# Patient Record
Sex: MALE | Race: NATIVE HAWAIIAN OR OTHER PACIFIC ISLANDER | Employment: FULL TIME | URBAN - METROPOLITAN AREA
[De-identification: names, ages, dates, MRNs, and addresses within clinical notes are randomized per-mention and may not be internally consistent; named-entity substitution may affect disease eponyms.]

---

## 2017-02-07 ENCOUNTER — ANTI-COAG VISIT (OUTPATIENT)
Dept: INTERNAL MEDICINE CLINIC | Facility: CLINIC | Age: 42
End: 2017-02-07

## 2017-02-07 DIAGNOSIS — D68.8 OTHER SPECIFIED COAGULATION DEFECTS (HCC): ICD-10-CM

## 2017-02-07 DIAGNOSIS — I26.99 PULMONARY EMBOLISM AND INFARCTION (HCC): Primary | ICD-10-CM

## 2017-02-07 LAB — INR: 3.2 (ref 2–3)

## 2017-02-07 PROCEDURE — 36416 COLLJ CAPILLARY BLOOD SPEC: CPT

## 2017-02-07 PROCEDURE — 85610 PROTHROMBIN TIME: CPT

## 2017-04-04 ENCOUNTER — ANTI-COAG VISIT (OUTPATIENT)
Dept: INTERNAL MEDICINE CLINIC | Facility: CLINIC | Age: 42
End: 2017-04-04

## 2017-04-04 DIAGNOSIS — I26.99 PULMONARY EMBOLISM AND INFARCTION (HCC): Primary | ICD-10-CM

## 2017-04-04 DIAGNOSIS — D68.8 OTHER SPECIFIED COAGULATION DEFECTS (HCC): ICD-10-CM

## 2017-04-04 PROCEDURE — 85610 PROTHROMBIN TIME: CPT

## 2017-04-04 PROCEDURE — 36416 COLLJ CAPILLARY BLOOD SPEC: CPT

## 2017-05-01 ENCOUNTER — OFFICE VISIT (OUTPATIENT)
Dept: FAMILY MEDICINE CLINIC | Facility: CLINIC | Age: 42
End: 2017-05-01

## 2017-05-01 VITALS
HEART RATE: 86 BPM | WEIGHT: 152 LBS | SYSTOLIC BLOOD PRESSURE: 120 MMHG | DIASTOLIC BLOOD PRESSURE: 85 MMHG | HEIGHT: 69.5 IN | TEMPERATURE: 98 F | BODY MASS INDEX: 22.01 KG/M2

## 2017-05-01 DIAGNOSIS — K21.9 GASTROESOPHAGEAL REFLUX DISEASE WITHOUT ESOPHAGITIS: ICD-10-CM

## 2017-05-01 DIAGNOSIS — I26.99 PULMONARY EMBOLISM AND INFARCTION (HCC): ICD-10-CM

## 2017-05-01 DIAGNOSIS — Z00.00 ROUTINE GENERAL MEDICAL EXAMINATION AT A HEALTH CARE FACILITY: Primary | ICD-10-CM

## 2017-05-01 DIAGNOSIS — G43.909 MIGRAINE WITHOUT STATUS MIGRAINOSUS, NOT INTRACTABLE, UNSPECIFIED MIGRAINE TYPE: ICD-10-CM

## 2017-05-01 PROCEDURE — 99396 PREV VISIT EST AGE 40-64: CPT | Performed by: FAMILY MEDICINE

## 2017-05-01 RX ORDER — WARFARIN SODIUM 5 MG/1
TABLET ORAL
Qty: 90 TABLET | Refills: 3 | Status: SHIPPED | OUTPATIENT
Start: 2017-05-01 | End: 2018-05-27

## 2017-05-01 RX ORDER — RIZATRIPTAN BENZOATE 10 MG/1
TABLET ORAL
Qty: 12 TABLET | Refills: 11 | Status: SHIPPED | OUTPATIENT
Start: 2017-05-01 | End: 2018-03-11

## 2017-05-01 NOTE — PROGRESS NOTES
HPI:    Patient ID: Aime Anderson is a 38year old male.     HPI  Patient presents with:  Routine Physical    Past Medical History   Diagnosis Date   • DVT (deep venous thrombosis) (HCC)      per ng coumadin   • Pulmonary embolism (HCC)      per ng couma Neurological: He is alert and oriented to person, place, and time. He has normal reflexes. Skin: Skin is warm, dry and intact. No rash noted. Psychiatric: His mood appears not anxious. He does not exhibit a depressed mood. Vitals reviewed.

## 2017-05-02 ENCOUNTER — ANTI-COAG VISIT (OUTPATIENT)
Dept: INTERNAL MEDICINE CLINIC | Facility: CLINIC | Age: 42
End: 2017-05-02

## 2017-05-02 DIAGNOSIS — I26.99 PULMONARY EMBOLISM AND INFARCTION (HCC): Primary | ICD-10-CM

## 2017-05-02 DIAGNOSIS — D68.8 OTHER SPECIFIED COAGULATION DEFECTS (HCC): ICD-10-CM

## 2017-05-02 PROCEDURE — 85610 PROTHROMBIN TIME: CPT

## 2017-05-02 PROCEDURE — 36416 COLLJ CAPILLARY BLOOD SPEC: CPT

## 2017-05-06 ENCOUNTER — LAB ENCOUNTER (OUTPATIENT)
Dept: LAB | Age: 42
End: 2017-05-06
Attending: FAMILY MEDICINE
Payer: COMMERCIAL

## 2017-05-06 DIAGNOSIS — Z00.00 ROUTINE GENERAL MEDICAL EXAMINATION AT A HEALTH CARE FACILITY: ICD-10-CM

## 2017-05-06 PROCEDURE — 36415 COLL VENOUS BLD VENIPUNCTURE: CPT

## 2017-05-06 PROCEDURE — 85025 COMPLETE CBC W/AUTO DIFF WBC: CPT

## 2017-05-06 PROCEDURE — 80061 LIPID PANEL: CPT

## 2017-05-06 PROCEDURE — 84443 ASSAY THYROID STIM HORMONE: CPT

## 2017-05-06 PROCEDURE — 80053 COMPREHEN METABOLIC PANEL: CPT

## 2017-05-17 ENCOUNTER — TELEPHONE (OUTPATIENT)
Dept: FAMILY MEDICINE CLINIC | Facility: CLINIC | Age: 42
End: 2017-05-17

## 2017-05-17 NOTE — TELEPHONE ENCOUNTER
Notes Recorded by Eran Arrington DO on 5/8/2017 at 9:06 AM  Stable labs with the exception of elevated cholesterol remains. Continue good diet and exercise.  Follow up yearly.  May need to consider a medication to lower cholesterol in the future    TCB

## 2017-06-12 ENCOUNTER — HOSPITAL ENCOUNTER (EMERGENCY)
Facility: HOSPITAL | Age: 42
Discharge: HOME | End: 2017-06-12
Attending: EMERGENCY MEDICINE
Payer: COMMERCIAL

## 2017-06-12 VITALS
WEIGHT: 154 LBS | TEMPERATURE: 98 F | HEIGHT: 69 IN | BODY MASS INDEX: 22.81 KG/M2 | HEART RATE: 76 BPM | RESPIRATION RATE: 16 BRPM | DIASTOLIC BLOOD PRESSURE: 84 MMHG | SYSTOLIC BLOOD PRESSURE: 119 MMHG | OXYGEN SATURATION: 98 %

## 2017-06-12 DIAGNOSIS — L03.811 CELLULITIS OF HEAD EXCEPT FACE: ICD-10-CM
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
## 2017-06-12 PROCEDURE — 99283 EMERGENCY DEPT VISIT LOW MDM: CPT

## 2017-06-12 RX ORDER — DOXYCYCLINE 100 MG/1
100 CAPSULE ORAL 2 TIMES DAILY
Qty: 14 CAPSULE | Refills: 0 | Status: SHIPPED | OUTPATIENT
Start: 2017-06-12 | End: 2017-06-19

## 2017-06-13 ENCOUNTER — TELEPHONE (OUTPATIENT)
Dept: FAMILY MEDICINE CLINIC | Facility: CLINIC | Age: 42
End: 2017-06-13

## 2017-06-13 NOTE — ED PROVIDER NOTES
Patient Seen in: [facility] AND [facility] Emergency Department    History   Patient presents with:  Bite Sting,Insect (integumentary)      HPI    The patient presents complaining of a rash/bump to the top of his head that he thinks could be a possible tick bite headaches. Constitutional and vital signs reviewed. All other systems reviewed and negative except as noted above. PSFH elements reviewed from today and agreed except as otherwise stated in HPI.     Physical Exam       ED Triage Vitals   BP 06 appointment as soon as possible for a visit in 3 days        Medications Prescribed:  Discharge Medication List as of 6/12/2017 11:21 PM    START taking these medications    Doxycycline Monohydrate 100 MG Oral Cap  Take 1 capsule (100 mg total) by mouth 2

## 2017-06-13 NOTE — TELEPHONE ENCOUNTER
Spoke with pt. Seen in ER for possible tick bite, prescribed doxycycline. Advised by pharmacist not to start doxy until he speaks with doctor because he takes coumadin and abx can increase INR.   Currently on warfarin 5 mg Mon, Wed, Fri, Sat and 2.5mg Tu,

## 2017-06-13 NOTE — TELEPHONE ENCOUNTER
Pt was seen in the ER on yesterday. Pt was told that the medication that he will be placed on will make his blood even thinner. Pt would like to speak to RN or  about what he was told.

## 2017-06-13 NOTE — ED INITIAL ASSESSMENT (HPI)
Pt was camping in [place] this weekend and now has a bump on the crown of his head that he thinks may be a tic. States he removed a tick from another area on his head.

## 2017-06-27 ENCOUNTER — ANTI-COAG VISIT (OUTPATIENT)
Dept: INTERNAL MEDICINE CLINIC | Facility: CLINIC | Age: 42
End: 2017-06-27

## 2017-06-27 DIAGNOSIS — I26.99 PULMONARY EMBOLISM AND INFARCTION (HCC): ICD-10-CM

## 2017-06-27 DIAGNOSIS — D68.8 OTHER SPECIFIED COAGULATION DEFECTS (HCC): ICD-10-CM

## 2017-06-27 PROCEDURE — 85610 PROTHROMBIN TIME: CPT

## 2017-06-27 PROCEDURE — 36416 COLLJ CAPILLARY BLOOD SPEC: CPT

## 2017-07-27 ENCOUNTER — TELEPHONE (OUTPATIENT)
Dept: FAMILY MEDICINE CLINIC | Facility: CLINIC | Age: 42
End: 2017-07-27

## 2017-07-27 NOTE — TELEPHONE ENCOUNTER
Patients wife Colonel Lawson dropped off at the [facility] [address] a copy of  Physical Form to be completed by dr Carmen Nunez. Please call when completed. Placed form in the Carmen Nunez mail box 7/27/17.

## 2017-07-28 NOTE — TELEPHONE ENCOUNTER
Dr. Ring Bias,   see wellness form and sign off, last physical and labs done 5/2017. See signature folder.

## 2017-08-23 NOTE — TELEPHONE ENCOUNTER
Called [facility], said original Rx was sent to [place], they will help Pt transfer over Rx. No further action needed.

## 2017-09-05 ENCOUNTER — ANTI-COAG VISIT (OUTPATIENT)
Dept: INTERNAL MEDICINE CLINIC | Facility: CLINIC | Age: 42
End: 2017-09-05

## 2017-09-05 DIAGNOSIS — D68.8 OTHER SPECIFIED COAGULATION DEFECTS (HCC): ICD-10-CM

## 2017-09-05 DIAGNOSIS — I26.99 PULMONARY EMBOLISM AND INFARCTION (HCC): ICD-10-CM

## 2017-09-05 LAB — INR: 3.5 (ref 2–3)

## 2017-09-05 PROCEDURE — 85610 PROTHROMBIN TIME: CPT

## 2017-09-05 PROCEDURE — 36416 COLLJ CAPILLARY BLOOD SPEC: CPT

## 2017-10-03 ENCOUNTER — ANTI-COAG VISIT (OUTPATIENT)
Dept: INTERNAL MEDICINE CLINIC | Facility: CLINIC | Age: 42
End: 2017-10-03

## 2017-10-03 DIAGNOSIS — D68.8 OTHER SPECIFIED COAGULATION DEFECTS (HCC): ICD-10-CM

## 2017-10-03 DIAGNOSIS — I26.99 PULMONARY EMBOLISM AND INFARCTION (HCC): ICD-10-CM

## 2017-10-03 PROCEDURE — 36416 COLLJ CAPILLARY BLOOD SPEC: CPT

## 2017-10-03 PROCEDURE — 85610 PROTHROMBIN TIME: CPT

## 2017-11-28 ENCOUNTER — ANTI-COAG VISIT (OUTPATIENT)
Dept: INTERNAL MEDICINE CLINIC | Facility: CLINIC | Age: 42
End: 2017-11-28

## 2017-11-28 ENCOUNTER — TELEPHONE (OUTPATIENT)
Dept: CARDIOLOGY CLINIC | Facility: CLINIC | Age: 42
End: 2017-11-28

## 2017-11-28 DIAGNOSIS — I26.99 PULMONARY EMBOLISM AND INFARCTION (HCC): ICD-10-CM

## 2017-11-28 DIAGNOSIS — D68.8 OTHER SPECIFIED COAGULATION DEFECTS (HCC): ICD-10-CM

## 2017-11-28 PROCEDURE — 99211 OFF/OP EST MAY X REQ PHY/QHP: CPT

## 2017-11-28 PROCEDURE — 85610 PROTHROMBIN TIME: CPT

## 2017-11-28 PROCEDURE — 36416 COLLJ CAPILLARY BLOOD SPEC: CPT

## 2017-11-28 NOTE — TELEPHONE ENCOUNTER
Hi Dr. Marin Joy,     Pt had INR 3.7. He was on 27.5 mg of warfarin weekly dose, I decreased him to 25 mg which is 9.1% decreased, and also hold warfarin for today, he request to come back in 8 weeks, not earlier.

## 2018-01-15 ENCOUNTER — TELEPHONE (OUTPATIENT)
Dept: CARDIOLOGY CLINIC | Facility: CLINIC | Age: 43
End: 2018-01-15

## 2018-01-15 DIAGNOSIS — D68.8 OTHER SPECIFIED COAGULATION DEFECTS (HCC): Primary | ICD-10-CM

## 2018-01-15 NOTE — TELEPHONE ENCOUNTER
Ritesh Landers,    Pt is due for renewal coumadin clinic order, please review and sign the pended order. Thanks.

## 2018-01-16 ENCOUNTER — ANTI-COAG VISIT (OUTPATIENT)
Dept: INTERNAL MEDICINE CLINIC | Facility: CLINIC | Age: 43
End: 2018-01-16

## 2018-01-16 DIAGNOSIS — D68.8 OTHER SPECIFIED COAGULATION DEFECTS (HCC): ICD-10-CM

## 2018-01-16 DIAGNOSIS — I26.99 PULMONARY EMBOLISM AND INFARCTION (HCC): ICD-10-CM

## 2018-01-23 ENCOUNTER — ANTI-COAG VISIT (OUTPATIENT)
Dept: INTERNAL MEDICINE CLINIC | Facility: CLINIC | Age: 43
End: 2018-01-23

## 2018-01-23 DIAGNOSIS — D68.8 OTHER SPECIFIED COAGULATION DEFECTS (HCC): ICD-10-CM

## 2018-01-23 DIAGNOSIS — I26.99 PULMONARY EMBOLISM AND INFARCTION (HCC): ICD-10-CM

## 2018-01-23 LAB — INR: 2.6 (ref 2–3)

## 2018-01-23 PROCEDURE — 85610 PROTHROMBIN TIME: CPT

## 2018-01-23 PROCEDURE — 93793 ANTICOAG MGMT PT WARFARIN: CPT

## 2018-03-16 RX ORDER — RIZATRIPTAN BENZOATE 10 MG/1
TABLET ORAL
Qty: 12 TABLET | Refills: 1 | Status: SHIPPED | OUTPATIENT
Start: 2018-03-16 | End: 2018-05-11

## 2018-03-20 ENCOUNTER — ANTI-COAG VISIT (OUTPATIENT)
Dept: INTERNAL MEDICINE CLINIC | Facility: CLINIC | Age: 43
End: 2018-03-20

## 2018-03-20 DIAGNOSIS — I26.99 PULMONARY EMBOLISM AND INFARCTION (HCC): ICD-10-CM

## 2018-03-20 DIAGNOSIS — D68.8 OTHER SPECIFIED COAGULATION DEFECTS (HCC): ICD-10-CM

## 2018-03-20 LAB
INR: 3.2 (ref 0.8–1.2)
TEST STRIP EXPIRATION DATE: ABNORMAL DATE

## 2018-03-20 PROCEDURE — 36416 COLLJ CAPILLARY BLOOD SPEC: CPT

## 2018-03-20 PROCEDURE — 85610 PROTHROMBIN TIME: CPT

## 2018-03-20 PROCEDURE — 93793 ANTICOAG MGMT PT WARFARIN: CPT

## 2018-04-10 ENCOUNTER — ANTI-COAG VISIT (OUTPATIENT)
Dept: INTERNAL MEDICINE CLINIC | Facility: CLINIC | Age: 43
End: 2018-04-10

## 2018-04-10 DIAGNOSIS — I26.99 PULMONARY EMBOLISM AND INFARCTION (HCC): ICD-10-CM

## 2018-04-10 DIAGNOSIS — D68.8 OTHER SPECIFIED COAGULATION DEFECTS (HCC): ICD-10-CM

## 2018-04-10 PROCEDURE — 85610 PROTHROMBIN TIME: CPT

## 2018-04-10 PROCEDURE — 36416 COLLJ CAPILLARY BLOOD SPEC: CPT

## 2018-04-10 PROCEDURE — 93793 ANTICOAG MGMT PT WARFARIN: CPT

## 2018-05-11 ENCOUNTER — OFFICE VISIT (OUTPATIENT)
Dept: FAMILY MEDICINE CLINIC | Facility: CLINIC | Age: 43
End: 2018-05-11

## 2018-05-11 VITALS
TEMPERATURE: 98 F | BODY MASS INDEX: 22.73 KG/M2 | WEIGHT: 157 LBS | SYSTOLIC BLOOD PRESSURE: 114 MMHG | HEIGHT: 69.5 IN | DIASTOLIC BLOOD PRESSURE: 83 MMHG | HEART RATE: 75 BPM

## 2018-05-11 DIAGNOSIS — K21.9 GASTROESOPHAGEAL REFLUX DISEASE WITHOUT ESOPHAGITIS: ICD-10-CM

## 2018-05-11 DIAGNOSIS — G43.909 MIGRAINE WITHOUT STATUS MIGRAINOSUS, NOT INTRACTABLE, UNSPECIFIED MIGRAINE TYPE: ICD-10-CM

## 2018-05-11 DIAGNOSIS — Z00.00 ROUTINE GENERAL MEDICAL EXAMINATION AT A HEALTH CARE FACILITY: Primary | ICD-10-CM

## 2018-05-11 DIAGNOSIS — I26.99 PULMONARY EMBOLISM AND INFARCTION (HCC): ICD-10-CM

## 2018-05-11 PROCEDURE — 99396 PREV VISIT EST AGE 40-64: CPT | Performed by: FAMILY MEDICINE

## 2018-05-11 RX ORDER — RIZATRIPTAN BENZOATE 10 MG/1
TABLET ORAL
Qty: 12 TABLET | Refills: 3 | Status: SHIPPED | OUTPATIENT
Start: 2018-05-11 | End: 2018-07-26

## 2018-05-12 ENCOUNTER — LAB ENCOUNTER (OUTPATIENT)
Dept: LAB | Age: 43
End: 2018-05-12
Attending: FAMILY MEDICINE
Payer: COMMERCIAL

## 2018-05-12 DIAGNOSIS — Z00.00 ROUTINE GENERAL MEDICAL EXAMINATION AT A HEALTH CARE FACILITY: ICD-10-CM

## 2018-05-12 PROCEDURE — 85025 COMPLETE CBC W/AUTO DIFF WBC: CPT

## 2018-05-12 PROCEDURE — 36415 COLL VENOUS BLD VENIPUNCTURE: CPT

## 2018-05-12 PROCEDURE — 80061 LIPID PANEL: CPT

## 2018-05-12 PROCEDURE — 80053 COMPREHEN METABOLIC PANEL: CPT

## 2018-05-12 NOTE — PROGRESS NOTES
HPI:    Patient ID: Manpreet Li is a 37year old male. HPI  Patient presents with:  Routine Physical: annual yearly check up, medication refill on Rizatriptan    Review of Systems   Constitutional: Negative. HENT: Negative. Eyes: Negative. facility  (primary encounter diagnosis)  Migraine without status migrainosus, not intractable, unspecified migraine type  Gastroesophageal reflux disease without esophagitis  Pulmonary embolism and infarction (hcc)    Overall feeling better.  Less GI sympto

## 2018-05-29 ENCOUNTER — TELEPHONE (OUTPATIENT)
Dept: FAMILY MEDICINE CLINIC | Facility: CLINIC | Age: 43
End: 2018-05-29

## 2018-05-29 RX ORDER — WARFARIN SODIUM 5 MG/1
TABLET ORAL
Qty: 30 TABLET | Refills: 2 | Status: SHIPPED | OUTPATIENT
Start: 2018-05-29 | End: 2018-09-05

## 2018-05-29 NOTE — TELEPHONE ENCOUNTER
Pt states meds below can only be refilled/sent to the [facility] in [place] on file and never picked up refill on 5/11/2018 due to price. •  DEXILANT 60 MG Oral Capsule Delayed Release, Take 60 mg by mouth daily. , Disp: 30 capsule, Rfl: 11

## 2018-06-19 ENCOUNTER — ANTI-COAG VISIT (OUTPATIENT)
Dept: INTERNAL MEDICINE CLINIC | Facility: CLINIC | Age: 43
End: 2018-06-19

## 2018-06-19 DIAGNOSIS — I26.99 PULMONARY EMBOLISM AND INFARCTION (HCC): ICD-10-CM

## 2018-06-19 DIAGNOSIS — D68.8 OTHER SPECIFIED COAGULATION DEFECTS (HCC): ICD-10-CM

## 2018-06-19 PROCEDURE — 85610 PROTHROMBIN TIME: CPT

## 2018-06-19 PROCEDURE — 36416 COLLJ CAPILLARY BLOOD SPEC: CPT

## 2018-06-19 PROCEDURE — 93793 ANTICOAG MGMT PT WARFARIN: CPT

## 2018-07-26 ENCOUNTER — NURSE TRIAGE (OUTPATIENT)
Dept: OTHER | Age: 43
End: 2018-07-26

## 2018-07-26 RX ORDER — ELETRIPTAN HYDROBROMIDE 40 MG/1
40 TABLET, FILM COATED ORAL AS NEEDED
Qty: 10 TABLET | Refills: 3 | Status: SHIPPED | OUTPATIENT
Start: 2018-07-26 | End: 2018-11-20

## 2018-07-26 NOTE — TELEPHONE ENCOUNTER
Spoke with Samir Jones from [address] [place] [address] who confirmed the Eletriptan is covered under the patient's insurance. Left message for patient to call back.

## 2018-07-26 NOTE — TELEPHONE ENCOUNTER
Action Requested: Summary for Provider     []  Critical Lab, Recommendations Needed  [] Need Additional Advice  []   FYI    []   Need Orders  [] Need Medications Sent to Pharmacy  []  Other     SUMMARY: Pt has a history of migraines.   LOV 05/2018 for migra

## 2018-07-27 NOTE — TELEPHONE ENCOUNTER
Left a complete message to the  (HIPAA) regarding the new medication, can call us back if there is any concern or question, will close this encounter.

## 2018-08-09 ENCOUNTER — TELEPHONE (OUTPATIENT)
Dept: FAMILY MEDICINE CLINIC | Facility: CLINIC | Age: 43
End: 2018-08-09

## 2018-08-13 NOTE — TELEPHONE ENCOUNTER
Pt called in stating that the forms that were dropped off have a deadline to be submitted by Friday this week. With the [employer] schedule Pt states that he believes the forms needs to be mailed by Wednesday.      Pt requesting a call back to confirm completion

## 2018-08-14 ENCOUNTER — ANTI-COAG VISIT (OUTPATIENT)
Dept: INTERNAL MEDICINE CLINIC | Facility: CLINIC | Age: 43
End: 2018-08-14
Payer: COMMERCIAL

## 2018-08-14 DIAGNOSIS — I26.99 PULMONARY EMBOLISM AND INFARCTION (HCC): ICD-10-CM

## 2018-08-14 DIAGNOSIS — D68.8 OTHER SPECIFIED COAGULATION DEFECTS (HCC): ICD-10-CM

## 2018-08-14 LAB — INR: 2.6 (ref 2–3)

## 2018-08-14 PROCEDURE — 93793 ANTICOAG MGMT PT WARFARIN: CPT | Performed by: FAMILY MEDICINE

## 2018-09-07 RX ORDER — WARFARIN SODIUM 5 MG/1
TABLET ORAL
Qty: 30 TABLET | Refills: 1 | Status: SHIPPED | OUTPATIENT
Start: 2018-09-07 | End: 2018-12-18

## 2018-10-09 ENCOUNTER — ANTI-COAG VISIT (OUTPATIENT)
Dept: INTERNAL MEDICINE CLINIC | Facility: CLINIC | Age: 43
End: 2018-10-09
Payer: COMMERCIAL

## 2018-10-09 DIAGNOSIS — D68.8 OTHER SPECIFIED COAGULATION DEFECTS (HCC): ICD-10-CM

## 2018-10-09 DIAGNOSIS — Z79.01 LONG TERM (CURRENT) USE OF ANTICOAGULANTS: ICD-10-CM

## 2018-10-09 DIAGNOSIS — Z51.81 ENCOUNTER FOR THERAPEUTIC DRUG MONITORING: Primary | ICD-10-CM

## 2018-10-09 DIAGNOSIS — I26.99 PULMONARY EMBOLISM AND INFARCTION (HCC): ICD-10-CM

## 2018-10-09 PROCEDURE — 93793 ANTICOAG MGMT PT WARFARIN: CPT

## 2018-10-09 PROCEDURE — 36416 COLLJ CAPILLARY BLOOD SPEC: CPT

## 2018-10-09 PROCEDURE — 85610 PROTHROMBIN TIME: CPT

## 2018-11-05 RX ORDER — RIZATRIPTAN BENZOATE 10 MG/1
TABLET ORAL
Qty: 12 TABLET | Refills: 2 | Status: SHIPPED | OUTPATIENT
Start: 2018-11-05 | End: 2019-02-24

## 2018-11-09 ENCOUNTER — TELEPHONE (OUTPATIENT)
Dept: OTHER | Age: 43
End: 2018-11-09

## 2018-11-09 DIAGNOSIS — Z86.69 HISTORY OF MIGRAINE HEADACHES: Primary | ICD-10-CM

## 2018-11-09 NOTE — TELEPHONE ENCOUNTER
Dr Joe Chowdary Patient [address] [place] [address] 05/11/18 You wrote a note on migraines but the visit was for px. Referral pended. If you agree please sign, or does patient need to schedule? Please advise.

## 2018-11-09 NOTE — TELEPHONE ENCOUNTER
Pt is asking Dr Namita Valencia if he can enter referral for him to see pain specialist at [facility] for his Migraines. Per pt his wife sees Dr Marilyn Reinoso at the [facility].   Pt is asking if Dr Namita Valencia can enter referral for this MD or does pt need t

## 2018-11-16 ENCOUNTER — OFFICE VISIT (OUTPATIENT)
Dept: FAMILY MEDICINE CLINIC | Facility: CLINIC | Age: 43
End: 2018-11-16
Payer: COMMERCIAL

## 2018-11-16 ENCOUNTER — APPOINTMENT (OUTPATIENT)
Dept: LAB | Age: 43
End: 2018-11-16
Attending: FAMILY MEDICINE
Payer: COMMERCIAL

## 2018-11-16 VITALS
TEMPERATURE: 98 F | SYSTOLIC BLOOD PRESSURE: 113 MMHG | WEIGHT: 157 LBS | HEART RATE: 69 BPM | DIASTOLIC BLOOD PRESSURE: 77 MMHG | BODY MASS INDEX: 23 KG/M2

## 2018-11-16 DIAGNOSIS — R35.0 FREQUENT URINATION: Primary | ICD-10-CM

## 2018-11-16 DIAGNOSIS — R35.0 FREQUENT URINATION: ICD-10-CM

## 2018-11-16 DIAGNOSIS — S76.211A GROIN STRAIN, RIGHT, INITIAL ENCOUNTER: ICD-10-CM

## 2018-11-16 DIAGNOSIS — N41.0 ACUTE PROSTATITIS: ICD-10-CM

## 2018-11-16 PROCEDURE — 84153 ASSAY OF PSA TOTAL: CPT

## 2018-11-16 PROCEDURE — 81002 URINALYSIS NONAUTO W/O SCOPE: CPT | Performed by: FAMILY MEDICINE

## 2018-11-16 PROCEDURE — 36415 COLL VENOUS BLD VENIPUNCTURE: CPT

## 2018-11-16 PROCEDURE — 87086 URINE CULTURE/COLONY COUNT: CPT

## 2018-11-16 PROCEDURE — 99212 OFFICE O/P EST SF 10 MIN: CPT | Performed by: FAMILY MEDICINE

## 2018-11-16 PROCEDURE — 99214 OFFICE O/P EST MOD 30 MIN: CPT | Performed by: FAMILY MEDICINE

## 2018-11-16 RX ORDER — CIPROFLOXACIN 500 MG/1
500 TABLET, FILM COATED ORAL 2 TIMES DAILY
Qty: 14 TABLET | Refills: 0 | Status: SHIPPED | OUTPATIENT
Start: 2018-11-16 | End: 2019-04-12 | Stop reason: ALTCHOICE

## 2018-11-16 NOTE — PROGRESS NOTES
HPI:    Patient ID: Portillo Montero is a 37year old male. HPI  Patient presents with:  Groin Pain: RT testicle sore with pain at times, frequent urination, pressure,     Review of Systems   Constitutional: Negative.     Genitourinary: Positive for test [E]      Urine Culture, Routine [E]      Meds This Visit:  Requested Prescriptions     Signed Prescriptions Disp Refills   • Ciprofloxacin HCl 500 MG Oral Tab 14 tablet 0     Sig: Take 1 tablet (500 mg total) by mouth 2 (two) times daily.        Imaging & R

## 2018-11-20 NOTE — TELEPHONE ENCOUNTER
Faxed all documents. Fax failed. LVM patient to provide a different fax number or I can call Dr Adelfo Newsome office tomorrow.

## 2018-11-20 NOTE — TELEPHONE ENCOUNTER
Patient states that Dr. Jonn Wilburn office never received referral from Dr. Rashmi Don office. Please fax Referral, any office notes referring to migraines, and med list to [phone]. Patient needs this info faxed in order to schedule an appointment.

## 2018-11-22 NOTE — TELEPHONE ENCOUNTER
No Protocol on this med.        Requested Prescriptions     Pending Prescriptions Disp Refills   • ELETRIPTAN HYDROBROMIDE 40 MG Oral Tab [Pharmacy Med Name: Eletriptan Hydrobromide Oral Tablet 40 MG] 10 tablet 2     Sig: TAKE ONE TABLET BY MOUTH AS NEEDED,

## 2018-11-24 RX ORDER — ELETRIPTAN HYDROBROMIDE 40 MG/1
TABLET, FILM COATED ORAL
Qty: 10 TABLET | Refills: 2 | Status: SHIPPED | OUTPATIENT
Start: 2018-11-24 | End: 2019-03-01

## 2018-11-27 ENCOUNTER — HOSPITAL ENCOUNTER (EMERGENCY)
Facility: HOSPITAL | Age: 43
Discharge: HOME | End: 2018-11-28
Attending: EMERGENCY MEDICINE
Payer: COMMERCIAL

## 2018-11-27 DIAGNOSIS — R39.15 URINARY URGENCY: Primary | ICD-10-CM

## 2018-11-27 PROCEDURE — 81001 URINALYSIS AUTO W/SCOPE: CPT | Performed by: EMERGENCY MEDICINE

## 2018-11-27 PROCEDURE — 99285 EMERGENCY DEPT VISIT HI MDM: CPT

## 2018-11-27 PROCEDURE — 81001 URINALYSIS AUTO W/SCOPE: CPT

## 2018-11-27 PROCEDURE — 36415 COLL VENOUS BLD VENIPUNCTURE: CPT

## 2018-11-28 ENCOUNTER — TELEPHONE (OUTPATIENT)
Dept: OTHER | Age: 43
End: 2018-11-28

## 2018-11-28 ENCOUNTER — APPOINTMENT (OUTPATIENT)
Dept: ULTRASOUND IMAGING | Facility: HOSPITAL | Age: 43
End: 2018-11-28
Attending: EMERGENCY MEDICINE
Payer: COMMERCIAL

## 2018-11-28 ENCOUNTER — APPOINTMENT (OUTPATIENT)
Dept: CT IMAGING | Facility: HOSPITAL | Age: 43
End: 2018-11-28
Attending: EMERGENCY MEDICINE
Payer: COMMERCIAL

## 2018-11-28 VITALS
TEMPERATURE: 98 F | DIASTOLIC BLOOD PRESSURE: 88 MMHG | OXYGEN SATURATION: 99 % | SYSTOLIC BLOOD PRESSURE: 124 MMHG | RESPIRATION RATE: 18 BRPM | HEART RATE: 68 BPM

## 2018-11-28 PROCEDURE — 87491 CHLMYD TRACH DNA AMP PROBE: CPT | Performed by: EMERGENCY MEDICINE

## 2018-11-28 PROCEDURE — 76870 US EXAM SCROTUM: CPT | Performed by: EMERGENCY MEDICINE

## 2018-11-28 PROCEDURE — 80048 BASIC METABOLIC PNL TOTAL CA: CPT | Performed by: EMERGENCY MEDICINE

## 2018-11-28 PROCEDURE — 85025 COMPLETE CBC W/AUTO DIFF WBC: CPT | Performed by: EMERGENCY MEDICINE

## 2018-11-28 PROCEDURE — 74176 CT ABD & PELVIS W/O CONTRAST: CPT | Performed by: EMERGENCY MEDICINE

## 2018-11-28 PROCEDURE — 85610 PROTHROMBIN TIME: CPT | Performed by: EMERGENCY MEDICINE

## 2018-11-28 PROCEDURE — 93975 VASCULAR STUDY: CPT | Performed by: EMERGENCY MEDICINE

## 2018-11-28 PROCEDURE — 87591 N.GONORRHOEAE DNA AMP PROB: CPT | Performed by: EMERGENCY MEDICINE

## 2018-11-28 RX ORDER — PHENAZOPYRIDINE HYDROCHLORIDE 100 MG/1
100 TABLET, FILM COATED ORAL 2 TIMES DAILY
Qty: 6 TABLET | Refills: 0 | Status: SHIPPED | OUTPATIENT
Start: 2018-11-28 | End: 2018-12-01

## 2018-11-28 NOTE — TELEPHONE ENCOUNTER
Pt calling to update Dr Tavo Chisholm that he went to ER yesterday as his symptoms were not relieved by Cipro that was rx on 11/16/18. Pt states he had urinary urgency and discomfort in Right Testicle and was concerned so went to ER.     Per pt ER did labs/CT and

## 2018-11-28 NOTE — ED NOTES
Pt states having urinary frequency and rt testicle pain radiating to lt testicle and penis. States was treated for UTI recently. Was feeling better for a little while then pain returned.

## 2018-11-28 NOTE — ED PROVIDER NOTES
Patient Seen in: [facility]    History   Patient presents with:  Urinary Symptoms (urologic)    Stated Complaint: frequent urination/ dx with uti last week    HPI    15-year-old male who is healthy with history of present anti regular rhythm and intact distal pulses. Pulmonary/Chest: Effort normal. No respiratory distress. Abdominal: Soft. There is no tenderness. There is no guarding. No organomegaly. No peritoneal signs or distention. No flank pain.   Genitourinary: Peni testes appear homogeneous in echotexture with normal and symmetric color Doppler flow, and normal spectral Doppler waveforms  -No significant epididymal abnormality identified  -Small varicocele on the left      Results faxed at 1:13 AM CT.   Can call [phone]

## 2018-11-28 NOTE — ED INITIAL ASSESSMENT (HPI)
Frequent urination, saw PMD last week, given antibiotics for UTI. States abd pressure, denies pain with urination. States chills and shakes.

## 2018-12-05 ENCOUNTER — TELEPHONE (OUTPATIENT)
Dept: FAMILY MEDICINE CLINIC | Facility: CLINIC | Age: 43
End: 2018-12-05

## 2018-12-05 NOTE — TELEPHONE ENCOUNTER
Sara Earl from [facility] called in requesting OV notes from pt's last OV. Pt has an appt with Dr. Juancarlos Moss on Friday 12/07/18. Please advise.   Phone: [phone]  Fax: [phone]

## 2018-12-11 ENCOUNTER — ANTI-COAG VISIT (OUTPATIENT)
Dept: INTERNAL MEDICINE CLINIC | Facility: CLINIC | Age: 43
End: 2018-12-11
Payer: COMMERCIAL

## 2018-12-11 DIAGNOSIS — D68.8 OTHER SPECIFIED COAGULATION DEFECTS (HCC): ICD-10-CM

## 2018-12-11 DIAGNOSIS — Z51.81 ENCOUNTER FOR THERAPEUTIC DRUG MONITORING: Primary | ICD-10-CM

## 2018-12-11 DIAGNOSIS — I26.99 PULMONARY EMBOLISM AND INFARCTION (HCC): ICD-10-CM

## 2018-12-11 DIAGNOSIS — Z79.01 LONG TERM (CURRENT) USE OF ANTICOAGULANTS: ICD-10-CM

## 2018-12-11 PROCEDURE — 36416 COLLJ CAPILLARY BLOOD SPEC: CPT

## 2018-12-11 PROCEDURE — 85610 PROTHROMBIN TIME: CPT

## 2018-12-11 PROCEDURE — 93793 ANTICOAG MGMT PT WARFARIN: CPT

## 2018-12-20 RX ORDER — WARFARIN SODIUM 5 MG/1
TABLET ORAL
Qty: 30 TABLET | Refills: 0 | Status: SHIPPED | OUTPATIENT
Start: 2018-12-20 | End: 2019-01-27

## 2019-01-17 ENCOUNTER — TELEPHONE (OUTPATIENT)
Dept: FAMILY MEDICINE CLINIC | Facility: CLINIC | Age: 44
End: 2019-01-17

## 2019-01-17 NOTE — TELEPHONE ENCOUNTER
Pt is requesting to fax order to Dr. Jonn Wilburn stating he needs to be seen for Migraines       Attn: [address] [facility] ([facility])  Fax # [phone]

## 2019-01-18 NOTE — TELEPHONE ENCOUNTER
Referral faxed to dr Stephy Hong, fax was successful   Called spoke with pt in regards to rferral faxed successful y

## 2019-01-23 ENCOUNTER — TELEPHONE (OUTPATIENT)
Dept: FAMILY MEDICINE CLINIC | Facility: CLINIC | Age: 44
End: 2019-01-23

## 2019-01-23 NOTE — TELEPHONE ENCOUNTER
[facility]/ Dr. Bina Blancas office called in requesting to have pt's last OV notes faxed to their office.   Please advise   Fax: [phone]

## 2019-01-28 NOTE — TELEPHONE ENCOUNTER
No Protocol on this med.      Requested Prescriptions     Pending Prescriptions Disp Refills   • WARFARIN SODIUM 5 MG Oral Tab [Pharmacy Med Name: Warfarin Sodium Oral Tablet 5 MG] 30 tablet 0     Sig: TAKE ONE TABLET BY MOUTH IN THE EVENING       Last [facility]

## 2019-01-30 RX ORDER — WARFARIN SODIUM 5 MG/1
TABLET ORAL
Qty: 30 TABLET | Refills: 0 | Status: SHIPPED | OUTPATIENT
Start: 2019-01-30 | End: 2019-02-24

## 2019-02-05 ENCOUNTER — TELEPHONE (OUTPATIENT)
Dept: INTERNAL MEDICINE CLINIC | Facility: CLINIC | Age: 44
End: 2019-02-05

## 2019-02-05 DIAGNOSIS — Z51.81 ENCOUNTER FOR THERAPEUTIC DRUG MONITORING: Primary | ICD-10-CM

## 2019-02-05 DIAGNOSIS — Z79.01 LONG TERM (CURRENT) USE OF ANTICOAGULANTS: ICD-10-CM

## 2019-02-05 DIAGNOSIS — I26.99 PULMONARY EMBOLISM AND INFARCTION (HCC): ICD-10-CM

## 2019-02-05 DIAGNOSIS — D68.9 COAGULATION DEFECT (HCC): ICD-10-CM

## 2019-02-05 NOTE — TELEPHONE ENCOUNTER
anticoag referral . Next INR 19. Order pended. Please sign, thank you.     Other and unspecified coagulation defects [D68.9]   Other pulmonary embolism and infarction [I26.99]   Pulmonary embolism and infarction ([address] [I26.99]   Other specified

## 2019-02-11 ENCOUNTER — MED REC SCAN ONLY (OUTPATIENT)
Dept: FAMILY MEDICINE CLINIC | Facility: CLINIC | Age: 44
End: 2019-02-11

## 2019-02-11 NOTE — PROGRESS NOTES
[facility] consult report on 2/8/19, report put in blue folder for dr review, will be sent to scan when reviewed

## 2019-02-12 ENCOUNTER — ANTI-COAG VISIT (OUTPATIENT)
Dept: INTERNAL MEDICINE CLINIC | Facility: CLINIC | Age: 44
End: 2019-02-12
Payer: COMMERCIAL

## 2019-02-12 DIAGNOSIS — D68.8 OTHER SPECIFIED COAGULATION DEFECTS (HCC): ICD-10-CM

## 2019-02-12 DIAGNOSIS — I26.99 PULMONARY EMBOLISM AND INFARCTION (HCC): ICD-10-CM

## 2019-02-12 DIAGNOSIS — Z79.01 LONG TERM (CURRENT) USE OF ANTICOAGULANTS: ICD-10-CM

## 2019-02-12 DIAGNOSIS — Z51.81 ENCOUNTER FOR THERAPEUTIC DRUG MONITORING: ICD-10-CM

## 2019-02-12 DIAGNOSIS — D68.9 COAGULATION DEFECT (HCC): ICD-10-CM

## 2019-02-12 LAB — INR: 2.6 (ref 2–3)

## 2019-02-12 PROCEDURE — 93793 ANTICOAG MGMT PT WARFARIN: CPT

## 2019-02-12 PROCEDURE — 85610 PROTHROMBIN TIME: CPT

## 2019-02-12 PROCEDURE — 36416 COLLJ CAPILLARY BLOOD SPEC: CPT

## 2019-02-26 NOTE — TELEPHONE ENCOUNTER
Review pended refill request as it does not fall under a protocol.     Last Rx: 1-30-19  LOV: 11-16-18    Refill Protocol Appointment Criteria  · Appointment scheduled in the past 6 months or in the next 3 months  Recent Outpatient Visits            3 month

## 2019-02-27 RX ORDER — RIZATRIPTAN BENZOATE 10 MG/1
TABLET ORAL
Qty: 12 TABLET | Refills: 1 | Status: SHIPPED | OUTPATIENT
Start: 2019-02-27 | End: 2019-04-12 | Stop reason: CLARIF

## 2019-02-27 RX ORDER — WARFARIN SODIUM 5 MG/1
TABLET ORAL
Qty: 30 TABLET | Refills: 0 | Status: SHIPPED | OUTPATIENT
Start: 2019-02-27 | End: 2019-04-24

## 2019-03-02 NOTE — TELEPHONE ENCOUNTER
Review pended refill request as it does not fall under a protocol.     Requested Prescriptions     Pending Prescriptions Disp Refills   • ELETRIPTAN HYDROBROMIDE 40 MG Oral Tab [Pharmacy Med Name: Eletriptan Hydrobromide 40 Mg Tab Teva] 10 tablet 1     Sig:

## 2019-03-05 RX ORDER — ELETRIPTAN HYDROBROMIDE 40 MG/1
TABLET, FILM COATED ORAL
Qty: 10 TABLET | Refills: 1 | Status: SHIPPED | OUTPATIENT
Start: 2019-03-05 | End: 2019-04-12

## 2019-03-15 RX ORDER — DEXLANSOPRAZOLE 60 MG/1
CAPSULE, DELAYED RELEASE ORAL
Qty: 90 CAPSULE | Refills: 0 | Status: CANCELLED | OUTPATIENT
Start: 2019-03-15

## 2019-03-21 NOTE — TELEPHONE ENCOUNTER
Reviewed chart and ok to refill one time 90 day supply. Pharmacy to notify pt that prescription is ready.

## 2019-04-09 ENCOUNTER — ANTI-COAG VISIT (OUTPATIENT)
Dept: INTERNAL MEDICINE CLINIC | Facility: CLINIC | Age: 44
End: 2019-04-09
Payer: COMMERCIAL

## 2019-04-09 DIAGNOSIS — D68.9 COAGULATION DEFECT (HCC): ICD-10-CM

## 2019-04-09 DIAGNOSIS — I26.99 PULMONARY EMBOLISM AND INFARCTION (HCC): ICD-10-CM

## 2019-04-09 DIAGNOSIS — Z51.81 ENCOUNTER FOR THERAPEUTIC DRUG MONITORING: ICD-10-CM

## 2019-04-09 DIAGNOSIS — D68.8 OTHER SPECIFIED COAGULATION DEFECTS (HCC): ICD-10-CM

## 2019-04-09 DIAGNOSIS — Z79.01 LONG TERM (CURRENT) USE OF ANTICOAGULANTS: ICD-10-CM

## 2019-04-09 PROCEDURE — 85610 PROTHROMBIN TIME: CPT

## 2019-04-09 PROCEDURE — 36416 COLLJ CAPILLARY BLOOD SPEC: CPT

## 2019-04-09 PROCEDURE — 93793 ANTICOAG MGMT PT WARFARIN: CPT

## 2019-04-12 ENCOUNTER — OFFICE VISIT (OUTPATIENT)
Dept: FAMILY MEDICINE CLINIC | Facility: CLINIC | Age: 44
End: 2019-04-12
Payer: COMMERCIAL

## 2019-04-12 VITALS
DIASTOLIC BLOOD PRESSURE: 86 MMHG | BODY MASS INDEX: 23.16 KG/M2 | SYSTOLIC BLOOD PRESSURE: 125 MMHG | TEMPERATURE: 98 F | WEIGHT: 160 LBS | HEIGHT: 69.5 IN | HEART RATE: 81 BPM

## 2019-04-12 DIAGNOSIS — K64.9 HEMORRHOIDS, UNSPECIFIED HEMORRHOID TYPE: Primary | ICD-10-CM

## 2019-04-12 DIAGNOSIS — G43.909 MIGRAINE WITHOUT STATUS MIGRAINOSUS, NOT INTRACTABLE, UNSPECIFIED MIGRAINE TYPE: ICD-10-CM

## 2019-04-12 DIAGNOSIS — L30.9 DERMATITIS: ICD-10-CM

## 2019-04-12 PROCEDURE — 99212 OFFICE O/P EST SF 10 MIN: CPT | Performed by: FAMILY MEDICINE

## 2019-04-12 PROCEDURE — 99213 OFFICE O/P EST LOW 20 MIN: CPT | Performed by: FAMILY MEDICINE

## 2019-04-12 RX ORDER — CLOTRIMAZOLE AND BETAMETHASONE DIPROPIONATE 10; .64 MG/G; MG/G
1 CREAM TOPICAL 2 TIMES DAILY PRN
Qty: 60 G | Refills: 1 | Status: SHIPPED | OUTPATIENT
Start: 2019-04-12 | End: 2019-05-24

## 2019-04-12 RX ORDER — ELETRIPTAN HYDROBROMIDE 40 MG/1
TABLET, FILM COATED ORAL
Qty: 10 TABLET | Refills: 3 | Status: SHIPPED | OUTPATIENT
Start: 2019-04-12 | End: 2019-05-24

## 2019-04-12 NOTE — PROGRESS NOTES
HPI:    Patient ID: Anneliese Hall is a 53year old male.     HPI  Patient presents with:  Hemorrhoids: for 3 wks issues, no blood in stool, better sitting, taking more fiber, burning itching  on anus, taking preperation H    Review of Systems   Constitut W/FLEX + EXT) (CPT=72052)       OG#[id number]

## 2019-04-17 ENCOUNTER — HOSPITAL ENCOUNTER (OUTPATIENT)
Dept: GENERAL RADIOLOGY | Facility: HOSPITAL | Age: 44
Discharge: HOME | End: 2019-04-17
Attending: FAMILY MEDICINE
Payer: COMMERCIAL

## 2019-04-17 ENCOUNTER — NURSE TRIAGE (OUTPATIENT)
Dept: OTHER | Age: 44
End: 2019-04-17

## 2019-04-17 DIAGNOSIS — G43.909 MIGRAINE WITHOUT STATUS MIGRAINOSUS, NOT INTRACTABLE, UNSPECIFIED MIGRAINE TYPE: ICD-10-CM

## 2019-04-17 PROCEDURE — 72050 X-RAY EXAM NECK SPINE 4/5VWS: CPT | Performed by: FAMILY MEDICINE

## 2019-04-17 NOTE — TELEPHONE ENCOUNTER
Message noted. Agree with triage advice given. Can follow up for appointment if persistent symptoms.

## 2019-04-17 NOTE — TELEPHONE ENCOUNTER
Patient returning a call and advised Dr Derian Hall note ,stated that he will monitor his symptoms for a week and will call us back for worsening symptoms. Note      Message noted. Agree with triage advice given.  Can follow up for appointment if persistent

## 2019-04-17 NOTE — TELEPHONE ENCOUNTER
Patient saw Dr Leon Kerr on 4/12/19. Patient has been using the rectal cream for the rectal itching and increasing the fiber in his diet. Patient not doing any weight lifting. Today patient had about 3 stools this morning.  Normal stool and one diarrhea stool

## 2019-04-26 RX ORDER — WARFARIN SODIUM 5 MG/1
TABLET ORAL
Qty: 90 TABLET | Refills: 1 | Status: SHIPPED | OUTPATIENT
Start: 2019-04-26 | End: 2020-01-06

## 2019-04-29 ENCOUNTER — OFFICE VISIT (OUTPATIENT)
Dept: FAMILY MEDICINE CLINIC | Facility: CLINIC | Age: 44
End: 2019-04-29
Payer: COMMERCIAL

## 2019-04-29 VITALS
WEIGHT: 160 LBS | HEART RATE: 87 BPM | DIASTOLIC BLOOD PRESSURE: 83 MMHG | SYSTOLIC BLOOD PRESSURE: 117 MMHG | BODY MASS INDEX: 23 KG/M2

## 2019-04-29 DIAGNOSIS — K64.9 HEMORRHOIDS, UNSPECIFIED HEMORRHOID TYPE: Primary | ICD-10-CM

## 2019-04-29 DIAGNOSIS — L30.9 DERMATITIS: ICD-10-CM

## 2019-04-29 PROCEDURE — 99212 OFFICE O/P EST SF 10 MIN: CPT | Performed by: FAMILY MEDICINE

## 2019-04-29 NOTE — PROGRESS NOTES
HPI:    Patient ID: Princess Polanco is a 53year old male. HPI  Patient presents with:  Hemorrhoids: f/u medication, pt still experiencing discomfort  some improvement but when med was stopped the rash and discomfort returned.    Review of Systems   Con

## 2019-05-03 ENCOUNTER — TELEPHONE (OUTPATIENT)
Dept: FAMILY MEDICINE CLINIC | Facility: CLINIC | Age: 44
End: 2019-05-03

## 2019-05-03 NOTE — TELEPHONE ENCOUNTER
Per pt, he's supposed to have procedure today in [place] for injection into his neck but when they found out that pt is taking Warfarin and forgot to tell pt not to take it 7 days prior to the procedure and need to take Lovenox instead.   Now pt needs a prescr

## 2019-05-06 RX ORDER — ENOXAPARIN SODIUM 100 MG/ML
30 INJECTION SUBCUTANEOUS 2 TIMES DAILY
Qty: 14 SYRINGE | Refills: 0 | Status: SHIPPED | OUTPATIENT
Start: 2019-05-06 | End: 2019-05-24

## 2019-05-06 NOTE — TELEPHONE ENCOUNTER
Start 7 days prior to procedure - do not take coumadin. Do not ue day of procedure. Restart coumadin day after procedure. Retest INR three days later. pharamacy can show how to use.

## 2019-05-16 ENCOUNTER — TELEPHONE (OUTPATIENT)
Dept: INTERNAL MEDICINE CLINIC | Facility: CLINIC | Age: 44
End: 2019-05-16

## 2019-05-16 NOTE — TELEPHONE ENCOUNTER
Pt called in stating her had a procedure at [facility] yesterday, 5/15, and was told by Dr. Almaguer Gravely to have PT/INR checked within 3 days. Pt asking to schedule. Please advise.

## 2019-05-21 ENCOUNTER — ANTI-COAG VISIT (OUTPATIENT)
Dept: INTERNAL MEDICINE CLINIC | Facility: CLINIC | Age: 44
End: 2019-05-21
Payer: COMMERCIAL

## 2019-05-21 DIAGNOSIS — D68.8 OTHER SPECIFIED COAGULATION DEFECTS (HCC): ICD-10-CM

## 2019-05-21 DIAGNOSIS — Z79.01 LONG TERM (CURRENT) USE OF ANTICOAGULANTS: ICD-10-CM

## 2019-05-21 DIAGNOSIS — I26.99 PULMONARY EMBOLISM AND INFARCTION (HCC): ICD-10-CM

## 2019-05-21 DIAGNOSIS — D68.9 COAGULATION DEFECT (HCC): ICD-10-CM

## 2019-05-21 DIAGNOSIS — Z51.81 ENCOUNTER FOR THERAPEUTIC DRUG MONITORING: ICD-10-CM

## 2019-05-21 PROCEDURE — 36416 COLLJ CAPILLARY BLOOD SPEC: CPT

## 2019-05-21 PROCEDURE — 93793 ANTICOAG MGMT PT WARFARIN: CPT

## 2019-05-21 PROCEDURE — 85610 PROTHROMBIN TIME: CPT

## 2019-05-24 ENCOUNTER — OFFICE VISIT (OUTPATIENT)
Dept: FAMILY MEDICINE CLINIC | Facility: CLINIC | Age: 44
End: 2019-05-24
Payer: COMMERCIAL

## 2019-05-24 VITALS
SYSTOLIC BLOOD PRESSURE: 122 MMHG | DIASTOLIC BLOOD PRESSURE: 89 MMHG | BODY MASS INDEX: 22.44 KG/M2 | HEIGHT: 69.5 IN | HEART RATE: 71 BPM | WEIGHT: 155 LBS | TEMPERATURE: 98 F

## 2019-05-24 DIAGNOSIS — D68.8 OTHER SPECIFIED COAGULATION DEFECTS (HCC): ICD-10-CM

## 2019-05-24 DIAGNOSIS — K21.9 GASTROESOPHAGEAL REFLUX DISEASE WITHOUT ESOPHAGITIS: ICD-10-CM

## 2019-05-24 DIAGNOSIS — G43.809 OTHER MIGRAINE WITHOUT STATUS MIGRAINOSUS, NOT INTRACTABLE: ICD-10-CM

## 2019-05-24 DIAGNOSIS — I26.99 PULMONARY EMBOLISM AND INFARCTION (HCC): ICD-10-CM

## 2019-05-24 DIAGNOSIS — Z00.00 ROUTINE GENERAL MEDICAL EXAMINATION AT A HEALTH CARE FACILITY: Primary | ICD-10-CM

## 2019-05-24 PROCEDURE — 99396 PREV VISIT EST AGE 40-64: CPT | Performed by: FAMILY MEDICINE

## 2019-05-24 RX ORDER — ELETRIPTAN HYDROBROMIDE 40 MG/1
TABLET, FILM COATED ORAL
Qty: 10 TABLET | Refills: 3 | Status: SHIPPED | OUTPATIENT
Start: 2019-05-24 | End: 2020-01-07

## 2019-05-24 RX ORDER — ENOXAPARIN SODIUM 100 MG/ML
30 INJECTION SUBCUTANEOUS 2 TIMES DAILY
Qty: 14 SYRINGE | Refills: 0 | Status: SHIPPED | OUTPATIENT
Start: 2019-05-24 | End: 2019-05-27

## 2019-05-24 NOTE — PROGRESS NOTES
HPI:    Patient ID: Everett Perez is a 53year old male. HPI  Patient presents with:  Physical: annual physical, lab orders   Medication Request: refill eletriptian, lovenox    Review of Systems   Constitutional: Negative. HENT: Negative.     Eyes: Skin: Skin is warm, dry and intact. No rash noted. Psychiatric: His mood appears anxious. He does not exhibit a depressed mood. Vitals reviewed.              ASSESSMENT/PLAN:   Routine general medical examination at a health care facility  (primary en

## 2019-05-29 ENCOUNTER — TELEPHONE (OUTPATIENT)
Dept: FAMILY MEDICINE CLINIC | Facility: CLINIC | Age: 44
End: 2019-05-29

## 2019-05-29 NOTE — TELEPHONE ENCOUNTER
Review pended refill request as it does not fall under a protocol.  Please advise if need refill See your note below    Last Rx: 5/24/19 #14    Your note in chart says following.         9:09 AM   Note      Start 7 days prior to procedure - do not take coum

## 2019-05-29 NOTE — TELEPHONE ENCOUNTER
Going to [facility] 6[facility]7 for another procedure confirming that he needs to be bridging w/lovenox for 7 days.  Advised if that is what the surgeon recommends and has done in the past and since Dr. Braden Burgess ordered the 7 days of Lovenox, he should follow their dave

## 2019-05-31 RX ORDER — ENOXAPARIN SODIUM 100 MG/ML
INJECTION SUBCUTANEOUS
Qty: 4.2 ML | Refills: 0 | Status: SHIPPED | OUTPATIENT
Start: 2019-05-31 | End: 2020-06-20 | Stop reason: ALTCHOICE

## 2019-06-01 ENCOUNTER — LAB ENCOUNTER (OUTPATIENT)
Dept: LAB | Age: 44
End: 2019-06-01
Attending: FAMILY MEDICINE
Payer: COMMERCIAL

## 2019-06-01 DIAGNOSIS — Z00.00 ROUTINE GENERAL MEDICAL EXAMINATION AT A HEALTH CARE FACILITY: ICD-10-CM

## 2019-06-01 DIAGNOSIS — R35.0 FREQUENT URINATION: ICD-10-CM

## 2019-06-01 PROCEDURE — 83721 ASSAY OF BLOOD LIPOPROTEIN: CPT

## 2019-06-01 PROCEDURE — 84443 ASSAY THYROID STIM HORMONE: CPT

## 2019-06-01 PROCEDURE — 85025 COMPLETE CBC W/AUTO DIFF WBC: CPT

## 2019-06-01 PROCEDURE — 80053 COMPREHEN METABOLIC PANEL: CPT

## 2019-06-01 PROCEDURE — 80061 LIPID PANEL: CPT

## 2019-06-01 PROCEDURE — 36415 COLL VENOUS BLD VENIPUNCTURE: CPT

## 2019-06-01 PROCEDURE — 87086 URINE CULTURE/COLONY COUNT: CPT

## 2019-06-01 PROCEDURE — 81003 URINALYSIS AUTO W/O SCOPE: CPT

## 2019-06-04 ENCOUNTER — TELEPHONE (OUTPATIENT)
Dept: FAMILY MEDICINE CLINIC | Facility: CLINIC | Age: 44
End: 2019-06-04

## 2019-06-04 DIAGNOSIS — E03.9 HYPOTHYROIDISM, UNSPECIFIED TYPE: Primary | ICD-10-CM

## 2019-06-04 NOTE — TELEPHONE ENCOUNTER
Pt is req an order to repeat lab for thyroid due to Pt states Dr enrique to repeat in 1 month, please call when order has been submitted.

## 2019-06-07 RX ORDER — DEXLANSOPRAZOLE 60 MG/1
CAPSULE, DELAYED RELEASE ORAL
Qty: 30 CAPSULE | Refills: 11 | OUTPATIENT
Start: 2019-06-07

## 2019-06-07 NOTE — TELEPHONE ENCOUNTER
[id number] - [address], [phone]   Outpatient Medication Detail      Disp Refills Start End    DEXILANT 60 MG Oral Capsule Delayed Release 30 capsule 11 5/24/2019     Sig - Route:  Take 60 mg by mouth daily. - Oral    Sent to pharmacy as: [facility] 60 MG Oral Capsule Delayed Release    E-Prescribing Status: Receipt confirmed by pharmacy (5/24/2019 10:23 AM CDT)      My Chart Message sent to patient to contact pharmacy for refills

## 2019-07-01 NOTE — TELEPHONE ENCOUNTER
Duplicate request, previously addressed. Requested Prescriptions     Pending Prescriptions Disp Refills   • DEXILANT 60 MG Oral Capsule Delayed Release [Pharmacy Med Name: [address] [place] [address] 60 MG CAPSULE] 90 capsule 3     Sig: TAKE ONE CAPSULE BY MOUTH DAILY.

## 2019-07-03 NOTE — TELEPHONE ENCOUNTER
Pt stated rx cost more at [address]- requested rx be sent to [facility]/90 day -rx sent

## 2019-07-06 ENCOUNTER — TELEPHONE (OUTPATIENT)
Dept: FAMILY MEDICINE CLINIC | Facility: CLINIC | Age: 44
End: 2019-07-06

## 2019-07-06 ENCOUNTER — APPOINTMENT (OUTPATIENT)
Dept: LAB | Age: 44
End: 2019-07-06
Attending: FAMILY MEDICINE
Payer: COMMERCIAL

## 2019-07-06 DIAGNOSIS — E03.9 HYPOTHYROIDISM, UNSPECIFIED TYPE: ICD-10-CM

## 2019-07-06 LAB
T4 FREE SERPL-MCNC: 0.9 NG/DL (ref 0.8–1.7)
TSI SER-ACNC: 0.42 MIU/ML (ref 0.36–3.74)

## 2019-07-06 PROCEDURE — 84439 ASSAY OF FREE THYROXINE: CPT

## 2019-07-06 PROCEDURE — 84443 ASSAY THYROID STIM HORMONE: CPT

## 2019-07-06 PROCEDURE — 36415 COLL VENOUS BLD VENIPUNCTURE: CPT

## 2019-07-10 ENCOUNTER — TELEPHONE (OUTPATIENT)
Dept: OTHER | Age: 44
End: 2019-07-10

## 2019-07-10 NOTE — TELEPHONE ENCOUNTER
Pt seen results via [employer]    Written by Tito Babb DO on 7/9/2019  6:24 PM   Please call patient and inform that the laboratory results are acceptable range. Resort to yearly testing.

## 2019-07-10 NOTE — TELEPHONE ENCOUNTER
Disregard message, filled out form put in folder for dr to sign in yellow folder will ask dr Erika Fermin if possible to sign

## 2019-07-10 NOTE — TELEPHONE ENCOUNTER
Lvm to patient. Form is ready for . Seen message below for form to be mailed. Mailed original left a copy if patient would like to .

## 2019-07-11 NOTE — TELEPHONE ENCOUNTER
Spoke with the patient who reports during his last visit with Dr. Trey Paige he informed Dr. Trey Paige that he did not want to be on the rizatriptan any more in combination with the eletriptan. Patient now reports he would like to go back to the old medication regimen. Patient reports he had better control of the migraines while he was on both medications. Patient requested for the request to be routed to Dr. Trey Paige only. Patient made aware Dr. Trey Paige is not back in the office until 7/18/19. Patient confirmed he does have refills available for the eletriptan and he can wait for a response from Dr. Trey Paige on 7/18/19. Routed to provider. Patient was also made aware of the TSH result from 7/6/19. Patient voiced understanding.

## 2019-07-11 NOTE — TELEPHONE ENCOUNTER
Pt would like a refill on his Rizatriptan medication (not listed). Pharmacy: [facility] ([facility]) per pt he is out of medication.

## 2019-07-16 ENCOUNTER — ANTI-COAG VISIT (OUTPATIENT)
Dept: INTERNAL MEDICINE CLINIC | Facility: CLINIC | Age: 44
End: 2019-07-16
Payer: COMMERCIAL

## 2019-07-16 DIAGNOSIS — D68.8 OTHER SPECIFIED COAGULATION DEFECTS (HCC): ICD-10-CM

## 2019-07-16 DIAGNOSIS — I26.99 PULMONARY EMBOLISM AND INFARCTION (HCC): ICD-10-CM

## 2019-07-16 DIAGNOSIS — D68.9 COAGULATION DEFECT (HCC): ICD-10-CM

## 2019-07-16 DIAGNOSIS — Z79.01 LONG TERM (CURRENT) USE OF ANTICOAGULANTS: ICD-10-CM

## 2019-07-16 DIAGNOSIS — Z51.81 ENCOUNTER FOR THERAPEUTIC DRUG MONITORING: ICD-10-CM

## 2019-07-16 LAB — INR: 2.6 (ref 2–3)

## 2019-07-16 PROCEDURE — 36416 COLLJ CAPILLARY BLOOD SPEC: CPT

## 2019-07-16 PROCEDURE — 93793 ANTICOAG MGMT PT WARFARIN: CPT

## 2019-07-16 PROCEDURE — 85610 PROTHROMBIN TIME: CPT

## 2019-07-19 RX ORDER — RIZATRIPTAN BENZOATE 10 MG/1
TABLET ORAL
Qty: 9 TABLET | Refills: 3 | Status: SHIPPED | OUTPATIENT
Start: 2019-07-19 | End: 2020-01-07

## 2019-07-22 ENCOUNTER — TELEPHONE (OUTPATIENT)
Dept: FAMILY MEDICINE CLINIC | Facility: CLINIC | Age: 44
End: 2019-07-22

## 2019-07-22 NOTE — TELEPHONE ENCOUNTER
[employer] administers the employer's wellness program, they have the form submitted by the patient for his lab values, they have triglycerides and total cholesterol but not the HDL or LDL.  They were provided the value of 43 for HDL and LDL cholest

## 2019-09-10 ENCOUNTER — ANTI-COAG VISIT (OUTPATIENT)
Dept: INTERNAL MEDICINE CLINIC | Facility: CLINIC | Age: 44
End: 2019-09-10
Payer: COMMERCIAL

## 2019-09-10 DIAGNOSIS — Z79.01 LONG TERM (CURRENT) USE OF ANTICOAGULANTS: ICD-10-CM

## 2019-09-10 DIAGNOSIS — D68.9 COAGULATION DEFECT (HCC): ICD-10-CM

## 2019-09-10 DIAGNOSIS — D68.8 OTHER SPECIFIED COAGULATION DEFECTS (HCC): ICD-10-CM

## 2019-09-10 DIAGNOSIS — Z51.81 ENCOUNTER FOR THERAPEUTIC DRUG MONITORING: ICD-10-CM

## 2019-09-10 DIAGNOSIS — I26.99 PULMONARY EMBOLISM AND INFARCTION (HCC): ICD-10-CM

## 2019-09-10 LAB — INR: 2.7 (ref 2–3)

## 2019-09-10 PROCEDURE — 93793 ANTICOAG MGMT PT WARFARIN: CPT

## 2019-09-10 PROCEDURE — 85610 PROTHROMBIN TIME: CPT

## 2019-09-10 PROCEDURE — 36416 COLLJ CAPILLARY BLOOD SPEC: CPT

## 2019-11-12 ENCOUNTER — ANTI-COAG VISIT (OUTPATIENT)
Dept: INTERNAL MEDICINE CLINIC | Facility: CLINIC | Age: 44
End: 2019-11-12
Payer: COMMERCIAL

## 2019-11-12 DIAGNOSIS — Z79.01 LONG TERM (CURRENT) USE OF ANTICOAGULANTS: ICD-10-CM

## 2019-11-12 DIAGNOSIS — D68.9 COAGULATION DEFECT (HCC): ICD-10-CM

## 2019-11-12 DIAGNOSIS — D68.8 OTHER SPECIFIED COAGULATION DEFECTS (HCC): ICD-10-CM

## 2019-11-12 DIAGNOSIS — I26.99 PULMONARY EMBOLISM AND INFARCTION (HCC): ICD-10-CM

## 2019-11-12 DIAGNOSIS — Z51.81 ENCOUNTER FOR THERAPEUTIC DRUG MONITORING: ICD-10-CM

## 2019-11-12 PROCEDURE — 93793 ANTICOAG MGMT PT WARFARIN: CPT

## 2019-11-12 PROCEDURE — 36416 COLLJ CAPILLARY BLOOD SPEC: CPT

## 2019-11-12 PROCEDURE — 85610 PROTHROMBIN TIME: CPT

## 2019-12-07 RX ORDER — ELETRIPTAN HYDROBROMIDE 40 MG/1
TABLET, FILM COATED ORAL
Qty: 10 TABLET | Refills: 5 | Status: SHIPPED | OUTPATIENT
Start: 2019-12-07 | End: 2020-06-20

## 2020-01-03 ENCOUNTER — TELEPHONE (OUTPATIENT)
Dept: FAMILY MEDICINE CLINIC | Facility: CLINIC | Age: 45
End: 2020-01-03

## 2020-01-03 NOTE — TELEPHONE ENCOUNTER
Confirmed 5 refills  With a quantity of 10 tabs on file for patient with pharmacist at [facility] will be processed on 01/05/2020, fill is to soon currently. Pharmacy to contact patient once medication is available for pickup.

## 2020-01-03 NOTE — TELEPHONE ENCOUNTER
Patient calling and states he need a refill for the medication he is running low     Eletriptan Hydrobromide 40 MG Oral Tab      Please advise

## 2020-01-03 NOTE — TELEPHONE ENCOUNTER
Patient calling and states there is a from he need to be complete for his insurance it can only be done on the website he said this is for the increase his medication     ELETRIPTAN HYDROBROMIDE 40 MG Oral Tab      [employer]      Please advise #[id number]

## 2020-01-04 NOTE — TELEPHONE ENCOUNTER
LM on patients VM, we ar unable to fill out form on website, patient will need to bring form in or company can fax us the form.

## 2020-01-07 ENCOUNTER — OFFICE VISIT (OUTPATIENT)
Dept: FAMILY MEDICINE CLINIC | Facility: CLINIC | Age: 45
End: 2020-01-07
Payer: COMMERCIAL

## 2020-01-07 VITALS
HEART RATE: 64 BPM | BODY MASS INDEX: 22.44 KG/M2 | HEIGHT: 69.5 IN | WEIGHT: 155 LBS | DIASTOLIC BLOOD PRESSURE: 83 MMHG | TEMPERATURE: 98 F | SYSTOLIC BLOOD PRESSURE: 123 MMHG

## 2020-01-07 DIAGNOSIS — G43.909 MIGRAINE WITHOUT STATUS MIGRAINOSUS, NOT INTRACTABLE, UNSPECIFIED MIGRAINE TYPE: Primary | ICD-10-CM

## 2020-01-07 PROCEDURE — 99214 OFFICE O/P EST MOD 30 MIN: CPT | Performed by: FAMILY MEDICINE

## 2020-01-07 RX ORDER — WARFARIN SODIUM 5 MG/1
TABLET ORAL
Qty: 90 TABLET | Refills: 1 | Status: SHIPPED | OUTPATIENT
Start: 2020-01-07 | End: 2020-06-20

## 2020-01-07 RX ORDER — ELETRIPTAN HYDROBROMIDE 40 MG/1
TABLET, FILM COATED ORAL
Qty: 12 TABLET | Refills: 4 | Status: SHIPPED | OUTPATIENT
Start: 2020-01-07 | End: 2020-05-18

## 2020-01-07 NOTE — TELEPHONE ENCOUNTER
Review pended refill request as it does not fall under a protocol.     Last Rx: 4/26/19  LOV: 5/24/19

## 2020-01-08 NOTE — PROGRESS NOTES
HPI:    Patient ID: Belinda Bird is a 53year old male.     HPI  Patient presents with:  Headache: headache/migrine have worsen,   Referral: for pain specialist for neck injection for migraines,    Medication Request: for eletriptan,  wants more of the Signed Prescriptions Disp Refills   • Eletriptan Hydrobromide 40 MG Oral Tab 12 tablet 4     Sig: TAKE ONE TABLET BY MOUTH AS NEEDED, MAY REPEAT DOSE IN TWO HOURS.  NO MORE THAN TWO TABLETS A DAY       Imaging & Referrals:  None       MA#[id number]

## 2020-01-21 ENCOUNTER — ANTI-COAG VISIT (OUTPATIENT)
Dept: INTERNAL MEDICINE CLINIC | Facility: CLINIC | Age: 45
End: 2020-01-21
Payer: COMMERCIAL

## 2020-01-21 DIAGNOSIS — Z51.81 ENCOUNTER FOR THERAPEUTIC DRUG MONITORING: ICD-10-CM

## 2020-01-21 DIAGNOSIS — I26.99 PULMONARY EMBOLISM AND INFARCTION (HCC): ICD-10-CM

## 2020-01-21 DIAGNOSIS — Z79.01 LONG TERM (CURRENT) USE OF ANTICOAGULANTS: ICD-10-CM

## 2020-01-21 DIAGNOSIS — D68.9 COAGULATION DEFECT (HCC): ICD-10-CM

## 2020-01-21 DIAGNOSIS — D68.8 OTHER SPECIFIED COAGULATION DEFECTS (HCC): ICD-10-CM

## 2020-01-21 LAB
INR: 2.1 (ref 0.8–1.2)
TEST STRIP EXPIRATION DATE: ABNORMAL DATE

## 2020-01-21 PROCEDURE — 85610 PROTHROMBIN TIME: CPT

## 2020-01-21 PROCEDURE — 36416 COLLJ CAPILLARY BLOOD SPEC: CPT

## 2020-01-21 PROCEDURE — 93793 ANTICOAG MGMT PT WARFARIN: CPT

## 2020-01-27 ENCOUNTER — OFFICE VISIT (OUTPATIENT)
Dept: FAMILY MEDICINE CLINIC | Facility: CLINIC | Age: 45
End: 2020-01-27
Payer: COMMERCIAL

## 2020-01-27 VITALS
WEIGHT: 155 LBS | BODY MASS INDEX: 22.44 KG/M2 | DIASTOLIC BLOOD PRESSURE: 82 MMHG | TEMPERATURE: 98 F | SYSTOLIC BLOOD PRESSURE: 126 MMHG | HEART RATE: 74 BPM | HEIGHT: 69.5 IN

## 2020-01-27 DIAGNOSIS — R10.13 EPIGASTRIC PAIN: ICD-10-CM

## 2020-01-27 DIAGNOSIS — S39.011A STRAIN OF ABDOMINAL WALL, INITIAL ENCOUNTER: ICD-10-CM

## 2020-01-27 DIAGNOSIS — K21.9 GASTROESOPHAGEAL REFLUX DISEASE, ESOPHAGITIS PRESENCE NOT SPECIFIED: Primary | ICD-10-CM

## 2020-01-27 PROCEDURE — 99213 OFFICE O/P EST LOW 20 MIN: CPT | Performed by: FAMILY MEDICINE

## 2020-01-27 NOTE — PROGRESS NOTES
HPI:    Patient ID: Anayeli Soliman is a 53year old male.     HPI  Patient presents with:  Abdominal Pain: upper abdominal pain, since 1/8/2020, had MANISH drink which caused pain, comes and goes pain, concerns with ulcer due to meds,    was working out [employer] With his hisotory of GERD he is well aware of that type of pain which he does not have. Seems like he may of strained himself with some of the exercise. See if it worsens when he starts working out again. He cannot take NSAID.    Has given up caffeine an

## 2020-05-18 RX ORDER — ELETRIPTAN HYDROBROMIDE 40 MG/1
TABLET, FILM COATED ORAL
Qty: 12 TABLET | Refills: 0 | Status: SHIPPED | OUTPATIENT
Start: 2020-05-18 | End: 2020-06-20

## 2020-06-20 ENCOUNTER — OFFICE VISIT (OUTPATIENT)
Dept: FAMILY MEDICINE CLINIC | Facility: CLINIC | Age: 45
End: 2020-06-20
Payer: COMMERCIAL

## 2020-06-20 VITALS
HEART RATE: 79 BPM | SYSTOLIC BLOOD PRESSURE: 125 MMHG | WEIGHT: 157 LBS | DIASTOLIC BLOOD PRESSURE: 90 MMHG | HEIGHT: 70 IN | TEMPERATURE: 99 F | BODY MASS INDEX: 22.48 KG/M2

## 2020-06-20 DIAGNOSIS — Z00.00 ROUTINE GENERAL MEDICAL EXAMINATION AT A HEALTH CARE FACILITY: Primary | ICD-10-CM

## 2020-06-20 DIAGNOSIS — K21.9 GASTROESOPHAGEAL REFLUX DISEASE WITHOUT ESOPHAGITIS: ICD-10-CM

## 2020-06-20 DIAGNOSIS — G43.909 MIGRAINE WITHOUT STATUS MIGRAINOSUS, NOT INTRACTABLE, UNSPECIFIED MIGRAINE TYPE: ICD-10-CM

## 2020-06-20 DIAGNOSIS — I26.99 PULMONARY EMBOLISM AND INFARCTION (HCC): ICD-10-CM

## 2020-06-20 PROBLEM — G57.03 BILATERAL PIRIFORMIS SYNDROME: Status: ACTIVE | Noted: 2020-06-20

## 2020-06-20 PROCEDURE — 99396 PREV VISIT EST AGE 40-64: CPT | Performed by: FAMILY MEDICINE

## 2020-06-20 RX ORDER — GALCANEZUMAB 120 MG/ML
INJECTION, SOLUTION SUBCUTANEOUS
COMMUNITY
Start: 2020-04-14 | End: 2020-08-28

## 2020-06-20 RX ORDER — WARFARIN SODIUM 5 MG/1
5 TABLET ORAL EVERY EVENING
Qty: 90 TABLET | Refills: 1 | Status: SHIPPED | OUTPATIENT
Start: 2020-06-20 | End: 2021-04-16

## 2020-06-20 RX ORDER — ELETRIPTAN HYDROBROMIDE 40 MG/1
40 TABLET, FILM COATED ORAL AS NEEDED
Qty: 10 TABLET | Refills: 5 | Status: SHIPPED | OUTPATIENT
Start: 2020-06-20 | End: 2021-04-15

## 2020-06-20 RX ORDER — METAXALONE 800 MG/1
800 TABLET ORAL 3 TIMES DAILY
Qty: 15 TABLET | Refills: 1 | Status: SHIPPED | OUTPATIENT
Start: 2020-06-20 | End: 2020-07-05

## 2020-06-20 RX ORDER — DEXLANSOPRAZOLE 60 MG/1
CAPSULE, DELAYED RELEASE ORAL
Qty: 90 CAPSULE | Refills: 3 | OUTPATIENT
Start: 2020-06-20

## 2020-06-22 ENCOUNTER — TELEPHONE (OUTPATIENT)
Dept: FAMILY MEDICINE CLINIC | Facility: CLINIC | Age: 45
End: 2020-06-22

## 2020-06-22 NOTE — TELEPHONE ENCOUNTER
Patient is requesting dexilant be transferred to [facility] pharmacy as his insurance only allows medication to be filled there. DEXILANT 60 MG Oral Capsule Delayed Release 90 capsule 3 6/20/2020     Sig - Route:  Take 60 mg by mouth daily. - Oral    Sent to [facility]

## 2020-07-16 ENCOUNTER — TELEPHONE (OUTPATIENT)
Dept: INTERNAL MEDICINE CLINIC | Facility: CLINIC | Age: 45
End: 2020-07-16

## 2020-07-16 DIAGNOSIS — D68.9 COAGULATION DEFECT (HCC): ICD-10-CM

## 2020-07-16 DIAGNOSIS — Z79.01 LONG TERM (CURRENT) USE OF ANTICOAGULANTS: ICD-10-CM

## 2020-07-16 DIAGNOSIS — I26.99 PULMONARY EMBOLISM AND INFARCTION (HCC): ICD-10-CM

## 2020-07-16 DIAGNOSIS — Z51.81 ENCOUNTER FOR THERAPEUTIC DRUG MONITORING: Primary | ICD-10-CM

## 2020-07-16 NOTE — TELEPHONE ENCOUNTER
Spoke with Giuseppe Russell, states with Due to Nikki he hasn't wanted to go out. He will get INR drawn at same time he gets labs for Dr. Alex Chase,   8 Nellie Osman referral . Order pended. Please sign, thank you.

## 2020-07-29 ENCOUNTER — ANTI-COAG VISIT (OUTPATIENT)
Dept: INTERNAL MEDICINE CLINIC | Facility: CLINIC | Age: 45
End: 2020-07-29

## 2020-07-29 ENCOUNTER — LAB ENCOUNTER (OUTPATIENT)
Dept: LAB | Age: 45
End: 2020-07-29
Attending: FAMILY MEDICINE
Payer: COMMERCIAL

## 2020-07-29 DIAGNOSIS — D68.9 COAGULATION DEFECT (HCC): ICD-10-CM

## 2020-07-29 DIAGNOSIS — Z79.01 LONG TERM (CURRENT) USE OF ANTICOAGULANTS: ICD-10-CM

## 2020-07-29 DIAGNOSIS — I26.99 PULMONARY EMBOLISM AND INFARCTION (HCC): ICD-10-CM

## 2020-07-29 DIAGNOSIS — Z00.00 ROUTINE GENERAL MEDICAL EXAMINATION AT A HEALTH CARE FACILITY: ICD-10-CM

## 2020-07-29 DIAGNOSIS — Z51.81 ENCOUNTER FOR THERAPEUTIC DRUG MONITORING: ICD-10-CM

## 2020-07-29 DIAGNOSIS — D68.8 OTHER SPECIFIED COAGULATION DEFECTS (HCC): ICD-10-CM

## 2020-07-29 LAB
ALBUMIN SERPL-MCNC: 4.4 G/DL (ref 3.4–5)
ALBUMIN/GLOB SERPL: 1.3 {RATIO} (ref 1–2)
ALP LIVER SERPL-CCNC: 88 U/L (ref 45–117)
ALT SERPL-CCNC: 50 U/L (ref 16–61)
ANION GAP SERPL CALC-SCNC: 3 MMOL/L (ref 0–18)
AST SERPL-CCNC: 36 U/L (ref 15–37)
BACTERIA UR QL AUTO: NEGATIVE /HPF
BASOPHILS # BLD AUTO: 0.05 X10(3) UL (ref 0–0.2)
BASOPHILS NFR BLD AUTO: 0.9 %
BILIRUB SERPL-MCNC: 0.6 MG/DL (ref 0.1–2)
BILIRUB UR QL: NEGATIVE
BUN BLD-MCNC: 29 MG/DL (ref 7–18)
BUN/CREAT SERPL: 23.4 (ref 10–20)
CALCIUM BLD-MCNC: 9.7 MG/DL (ref 8.5–10.1)
CHLORIDE SERPL-SCNC: 108 MMOL/L (ref 98–112)
CHOLEST SMN-MCNC: 321 MG/DL (ref ?–200)
CLARITY UR: CLEAR
CO2 SERPL-SCNC: 30 MMOL/L (ref 21–32)
COLOR UR: YELLOW
COMPLEXED PSA SERPL-MCNC: 0.62 NG/ML (ref ?–4)
CREAT BLD-MCNC: 1.24 MG/DL (ref 0.7–1.3)
DEPRECATED RDW RBC AUTO: 40.3 FL (ref 35.1–46.3)
EOSINOPHIL # BLD AUTO: 0.19 X10(3) UL (ref 0–0.7)
EOSINOPHIL NFR BLD AUTO: 3.3 %
ERYTHROCYTE [DISTWIDTH] IN BLOOD BY AUTOMATED COUNT: 12.4 % (ref 11–15)
GLOBULIN PLAS-MCNC: 3.4 G/DL (ref 2.8–4.4)
GLUCOSE BLD-MCNC: 95 MG/DL (ref 70–99)
GLUCOSE UR-MCNC: NEGATIVE MG/DL
HCT VFR BLD AUTO: 47.6 % (ref 39–53)
HDLC SERPL-MCNC: 44 MG/DL (ref 40–59)
HGB BLD-MCNC: 16 G/DL (ref 13–17.5)
HGB UR QL STRIP.AUTO: NEGATIVE
IMM GRANULOCYTES # BLD AUTO: 0 X10(3) UL (ref 0–1)
IMM GRANULOCYTES NFR BLD: 0 %
INR BLD: 2.71 (ref 0.9–1.2)
KETONES UR-MCNC: NEGATIVE MG/DL
LDLC SERPL CALC-MCNC: 212 MG/DL (ref ?–100)
LEUKOCYTE ESTERASE UR QL STRIP.AUTO: NEGATIVE
LYMPHOCYTES # BLD AUTO: 3.46 X10(3) UL (ref 1–4)
LYMPHOCYTES NFR BLD AUTO: 60.9 %
M PROTEIN MFR SERPL ELPH: 7.8 G/DL (ref 6.4–8.2)
MCH RBC QN AUTO: 29.9 PG (ref 26–34)
MCHC RBC AUTO-ENTMCNC: 33.6 G/DL (ref 31–37)
MCV RBC AUTO: 88.8 FL (ref 80–100)
MONOCYTES # BLD AUTO: 0.43 X10(3) UL (ref 0.1–1)
MONOCYTES NFR BLD AUTO: 7.6 %
NEUTROPHILS # BLD AUTO: 1.55 X10 (3) UL (ref 1.5–7.7)
NEUTROPHILS # BLD AUTO: 1.55 X10(3) UL (ref 1.5–7.7)
NEUTROPHILS NFR BLD AUTO: 27.3 %
NITRITE UR QL STRIP.AUTO: NEGATIVE
NONHDLC SERPL-MCNC: 277 MG/DL (ref ?–130)
OSMOLALITY SERPL CALC.SUM OF ELEC: 298 MOSM/KG (ref 275–295)
PATIENT FASTING Y/N/NP: YES
PATIENT FASTING Y/N/NP: YES
PH UR: 7 [PH] (ref 5–8)
PLATELET # BLD AUTO: 251 10(3)UL (ref 150–450)
POTASSIUM SERPL-SCNC: 4.5 MMOL/L (ref 3.5–5.1)
PROT UR-MCNC: 30 MG/DL
PROTHROMBIN TIME: 28.3 SECONDS (ref 11.8–14.5)
RBC # BLD AUTO: 5.36 X10(6)UL (ref 4.3–5.7)
RBC #/AREA URNS AUTO: 1 /HPF
SODIUM SERPL-SCNC: 141 MMOL/L (ref 136–145)
SP GR UR STRIP: 1.02 (ref 1–1.03)
TRIGL SERPL-MCNC: 325 MG/DL (ref 30–149)
UROBILINOGEN UR STRIP-ACNC: <2
VLDLC SERPL CALC-MCNC: 65 MG/DL (ref 0–30)
WBC # BLD AUTO: 5.7 X10(3) UL (ref 4–11)
WBC #/AREA URNS AUTO: 1 /HPF

## 2020-07-29 PROCEDURE — 36415 COLL VENOUS BLD VENIPUNCTURE: CPT

## 2020-07-29 PROCEDURE — 85025 COMPLETE CBC W/AUTO DIFF WBC: CPT

## 2020-07-29 PROCEDURE — 80061 LIPID PANEL: CPT

## 2020-07-29 PROCEDURE — 85610 PROTHROMBIN TIME: CPT

## 2020-07-29 PROCEDURE — 80053 COMPREHEN METABOLIC PANEL: CPT

## 2020-07-29 PROCEDURE — 81001 URINALYSIS AUTO W/SCOPE: CPT

## 2020-09-22 ENCOUNTER — TELEPHONE (OUTPATIENT)
Dept: FAMILY MEDICINE CLINIC | Facility: CLINIC | Age: 45
End: 2020-09-22

## 2020-09-22 NOTE — TELEPHONE ENCOUNTER
Called patient in regards to MyChart message below is having an evaluation  for root canal on 10/2 and asking what needs to be done with his Coumadin ? Would what be involved to bridge if he needs the dental work ? Please advise and thank you.

## 2020-09-22 NOTE — TELEPHONE ENCOUNTER
No coumadin 72 hrs prior then resume med follwing day. I would not recommend need for a bridge therapy.

## 2020-09-23 NOTE — TELEPHONE ENCOUNTER
Pt was called and informed of Dr. Valentina Molina message below and he verbalized understanding.  Thanks

## 2020-10-05 NOTE — TELEPHONE ENCOUNTER
Patient on coumadin daily, has root canal scheduled for this Thursday 10/8/20, he wanted to review Dr Sissy Martinez recommendations for holding his coumadin. Advised Dr Sissy Martinez 9/22/20 advice. Advised if any changes to the procedure, to call back.  Patient ve

## 2020-10-06 ENCOUNTER — TELEPHONE (OUTPATIENT)
Dept: FAMILY MEDICINE CLINIC | Facility: CLINIC | Age: 45
End: 2020-10-06

## 2020-10-06 RX ORDER — TRAMADOL HYDROCHLORIDE 50 MG/1
50 TABLET ORAL EVERY 6 HOURS PRN
Qty: 20 TABLET | Refills: 0 | Status: SHIPPED | OUTPATIENT
Start: 2020-10-06 | End: 2020-10-20 | Stop reason: ALTCHOICE

## 2020-10-06 NOTE — TELEPHONE ENCOUNTER
Root canal is Thursday, 10/8    Patient is asking about medication for pain control post root canal.  Dentist indicated ibuprofen or acetaminophen should be enough. Patient reports unable to take either to due side effects.   Ibuprofen due to taking warfar

## 2020-10-07 NOTE — TELEPHONE ENCOUNTER
Q.ME message sent to patient, please verify it is viewed. If not, please call patient with the update.

## 2020-10-14 ENCOUNTER — TELEPHONE (OUTPATIENT)
Dept: FAMILY MEDICINE CLINIC | Facility: CLINIC | Age: 45
End: 2020-10-14

## 2020-10-14 NOTE — TELEPHONE ENCOUNTER
Seems like Dr. Vahe Alvarez at [facility] saw this patient. Our GI doctor is Dr. Patricia White. Does patient want to continue seeing Dari or start seeing [facility]?

## 2020-10-14 NOTE — TELEPHONE ENCOUNTER
Patient calling and requesting referral to a specialist in [facility] to  treat his pelvic floor .   States that he was dx with pelvic floor dysfunction  last year at [facility] by Dr Latisha Adkins and now specialist  is working at [employer].  [facility] that

## 2020-10-16 ENCOUNTER — NURSE TRIAGE (OUTPATIENT)
Dept: FAMILY MEDICINE CLINIC | Facility: CLINIC | Age: 45
End: 2020-10-16

## 2020-10-16 NOTE — TELEPHONE ENCOUNTER
Patient scheduled Tuesday 10/20/20 with PCP to discuss piriformis syndrome. Chronic pain buttock region, feels very uncomfortable when sitting. If stands for more than an hour, has tingling in foot. He has seen PT for pelvic floor disorder.      Salvador

## 2020-10-20 ENCOUNTER — OFFICE VISIT (OUTPATIENT)
Dept: FAMILY MEDICINE CLINIC | Facility: CLINIC | Age: 45
End: 2020-10-20
Payer: COMMERCIAL

## 2020-10-20 VITALS
BODY MASS INDEX: 22.33 KG/M2 | SYSTOLIC BLOOD PRESSURE: 125 MMHG | DIASTOLIC BLOOD PRESSURE: 79 MMHG | HEART RATE: 66 BPM | WEIGHT: 156 LBS | HEIGHT: 70 IN

## 2020-10-20 DIAGNOSIS — M62.89 PELVIC FLOOR DYSFUNCTION: ICD-10-CM

## 2020-10-20 DIAGNOSIS — G57.03 BILATERAL PIRIFORMIS SYNDROME: Primary | ICD-10-CM

## 2020-10-20 PROCEDURE — 3008F BODY MASS INDEX DOCD: CPT | Performed by: FAMILY MEDICINE

## 2020-10-20 PROCEDURE — 99214 OFFICE O/P EST MOD 30 MIN: CPT | Performed by: FAMILY MEDICINE

## 2020-10-20 PROCEDURE — 3074F SYST BP LT 130 MM HG: CPT | Performed by: FAMILY MEDICINE

## 2020-10-20 PROCEDURE — 3078F DIAST BP <80 MM HG: CPT | Performed by: FAMILY MEDICINE

## 2020-10-20 RX ORDER — AMITRIPTYLINE HYDROCHLORIDE 50 MG/1
50 TABLET, FILM COATED ORAL NIGHTLY
Qty: 30 TABLET | Refills: 1 | Status: SHIPPED | OUTPATIENT
Start: 2020-10-20 | End: 2020-11-18

## 2020-10-21 NOTE — PROGRESS NOTES
HPI:    Patient ID: Sonia Graves is a 50year old male.     HPI  Patient presents with:  Pain: pain with poroformis syndrome, PT has helped relieve pain,  neurologist suggested to have MRI for back and lower pain,   Referral: for MRI, referral for pelvi at night for now to start and see physiatrist I recommend. He agrees. No orders of the defined types were placed in this encounter.       Meds This Visit:  Requested Prescriptions     Signed Prescriptions Disp Refills   • Amitriptyline HCl 50 MG Oral Tab

## 2020-10-26 ENCOUNTER — TELEPHONE (OUTPATIENT)
Dept: NEUROLOGY | Facility: CLINIC | Age: 45
End: 2020-10-26

## 2020-10-26 ENCOUNTER — OFFICE VISIT (OUTPATIENT)
Dept: NEUROLOGY | Facility: CLINIC | Age: 45
End: 2020-10-26
Payer: COMMERCIAL

## 2020-10-26 ENCOUNTER — HOSPITAL ENCOUNTER (OUTPATIENT)
Dept: GENERAL RADIOLOGY | Facility: HOSPITAL | Age: 45
Discharge: HOME | End: 2020-10-26
Attending: PHYSICAL MEDICINE & REHABILITATION
Payer: COMMERCIAL

## 2020-10-26 DIAGNOSIS — G89.29 CHRONIC BILATERAL LOW BACK PAIN WITH BILATERAL SCIATICA: Primary | ICD-10-CM

## 2020-10-26 DIAGNOSIS — M62.89 PELVIC FLOOR DYSFUNCTION: ICD-10-CM

## 2020-10-26 DIAGNOSIS — I26.99 PULMONARY EMBOLISM AND INFARCTION (HCC): ICD-10-CM

## 2020-10-26 DIAGNOSIS — M54.42 CHRONIC BILATERAL LOW BACK PAIN WITH BILATERAL SCIATICA: Primary | ICD-10-CM

## 2020-10-26 DIAGNOSIS — M54.41 CHRONIC BILATERAL LOW BACK PAIN WITH BILATERAL SCIATICA: Primary | ICD-10-CM

## 2020-10-26 DIAGNOSIS — M54.42 CHRONIC BILATERAL LOW BACK PAIN WITH BILATERAL SCIATICA: ICD-10-CM

## 2020-10-26 DIAGNOSIS — Z79.01 LONG TERM (CURRENT) USE OF ANTICOAGULANTS: ICD-10-CM

## 2020-10-26 DIAGNOSIS — M54.41 CHRONIC BILATERAL LOW BACK PAIN WITH BILATERAL SCIATICA: ICD-10-CM

## 2020-10-26 DIAGNOSIS — G89.29 CHRONIC BILATERAL LOW BACK PAIN WITH BILATERAL SCIATICA: ICD-10-CM

## 2020-10-26 PROCEDURE — 72114 X-RAY EXAM L-S SPINE BENDING: CPT | Performed by: PHYSICAL MEDICINE & REHABILITATION

## 2020-10-26 PROCEDURE — 99244 OFF/OP CNSLTJ NEW/EST MOD 40: CPT | Performed by: PHYSICAL MEDICINE & REHABILITATION

## 2020-10-26 RX ORDER — UBROGEPANT 50 MG/1
50 TABLET ORAL AS NEEDED
COMMUNITY
End: 2021-04-15

## 2020-10-26 NOTE — PROGRESS NOTES
[address]  NEW PATIENT EVALUATION    Consultation as a request of Dr. Ele See    Chief Complaint: back pain.     HISTORY OF PRESENT ILLNESS:   Patient presents with:  Pain: new patient here with 7mos hx of b in January for work and at this time he is unsure whether he would be able to sit for prolonged periods. He has not had any x-ray or MRI imaging of his lumbar spine. He has not any other treatments aside from physical therapy.   He has started taking vin reviewed. No pertinent family history.        SOCIAL HISTORY:   Social History    Tobacco Use      Smoking status: Never Smoker      Smokeless tobacco: Never Used    Alcohol use: No    Drug use: No         REVIEW OF SYSTEMS:   Patient-reported ROS  Constitu noted    Musculoskeletal/Neurological Exam:    LUMBAR SPINE:  Inspection: no erythema, swelling, or obvious deformity.   Their iliac crest and shoulder heights are symmetrical.     Palpation: Non tender to palpation of the spinous process, lumbar paraspinal results were reviewed and discussed with patient. ASSESSMENT:     1. Chronic bilateral low back pain with bilateral sciatica    2.  Pelvic floor dysfunction        Merry Hairston is a pleasant 20-year-old gentleman who presents today for evaluation o

## 2020-10-26 NOTE — TELEPHONE ENCOUNTER
[employer] for authorization of approval for MRI L-spine wo cpt code 45719.  Approved with Case Number: [id number]  [employer] will reach out to your patient to inform them about potential cost-effective convenient facilities for this medically necessary se

## 2020-10-28 NOTE — TELEPHONE ENCOUNTER
Chad Correia from [facility] is requesting for MRI order to be changed to another location or external to be authorized-- please advise

## 2020-11-02 NOTE — TELEPHONE ENCOUNTER
Inder Rodriguez from [facility] called again to have the order for the MRI changed to go [facility], fax # [phone]

## 2020-11-06 ENCOUNTER — OFFICE VISIT (OUTPATIENT)
Dept: FAMILY MEDICINE CLINIC | Facility: CLINIC | Age: 45
End: 2020-11-06
Payer: COMMERCIAL

## 2020-11-06 ENCOUNTER — NURSE TRIAGE (OUTPATIENT)
Dept: FAMILY MEDICINE CLINIC | Facility: CLINIC | Age: 45
End: 2020-11-06

## 2020-11-06 VITALS
HEART RATE: 85 BPM | HEIGHT: 70 IN | DIASTOLIC BLOOD PRESSURE: 89 MMHG | SYSTOLIC BLOOD PRESSURE: 133 MMHG | BODY MASS INDEX: 22.33 KG/M2 | WEIGHT: 156 LBS

## 2020-11-06 DIAGNOSIS — K21.9 GASTROESOPHAGEAL REFLUX DISEASE WITHOUT ESOPHAGITIS: Primary | ICD-10-CM

## 2020-11-06 DIAGNOSIS — R19.5 CHANGE IN CONSISTENCY OF STOOL: ICD-10-CM

## 2020-11-06 PROCEDURE — 85018 HEMOGLOBIN: CPT | Performed by: FAMILY MEDICINE

## 2020-11-06 PROCEDURE — 3008F BODY MASS INDEX DOCD: CPT | Performed by: FAMILY MEDICINE

## 2020-11-06 PROCEDURE — 3075F SYST BP GE 130 - 139MM HG: CPT | Performed by: FAMILY MEDICINE

## 2020-11-06 PROCEDURE — 99072 ADDL SUPL MATRL&STAF TM PHE: CPT | Performed by: FAMILY MEDICINE

## 2020-11-06 PROCEDURE — 99213 OFFICE O/P EST LOW 20 MIN: CPT | Performed by: FAMILY MEDICINE

## 2020-11-06 PROCEDURE — 36416 COLLJ CAPILLARY BLOOD SPEC: CPT | Performed by: FAMILY MEDICINE

## 2020-11-06 PROCEDURE — 3079F DIAST BP 80-89 MM HG: CPT | Performed by: FAMILY MEDICINE

## 2020-11-06 NOTE — PROGRESS NOTES
HPI:    Patient ID: Ananda Toure is a 50year old male.     HPI  Patient presents with:  Stomach Pain: having middle of stomach pain, was drinking coffee which pain would reoccure, stopped caffine acidic foods coffee, pain is not bad for stomach, hurts Drink less caffeine. Normal hemoglobin level.   Orders Placed This Encounter      POC Hemoglobin [[id number]]      Meds This Visit:  Requested Prescriptions      No prescriptions requested or ordered in this encounter       Imaging & Referrals:  None       ID#

## 2020-11-06 NOTE — TELEPHONE ENCOUNTER
Action Requested: Summary for Provider     []  Critical Lab, Recommendations Needed  [] Need Additional Advice  []   FYI    []   Need Orders  [] Need Medications Sent to Pharmacy  []  Other     SUMMARY: OV scheduled today with Dr Lidya Kelsey for eval stomach u

## 2020-11-09 NOTE — TELEPHONE ENCOUNTER
Jose Alfredo Woods ([facility]) is asking if the authorization has be switched over to them. They are still showing [place].

## 2020-11-09 NOTE — TELEPHONE ENCOUNTER
S/w Db Mejias [facility] : [facility]) who is requesting our office fax MRI order to [facility] again. [facility] states they do not have the order. Faxed order to [phone].

## 2020-11-12 ENCOUNTER — TELEPHONE (OUTPATIENT)
Dept: NEUROLOGY | Facility: CLINIC | Age: 45
End: 2020-11-12

## 2020-11-13 NOTE — TELEPHONE ENCOUNTER
Shows disc problem at L4-5. Please call him and advise to follow up as discussed and to bring his MRI disc with him. Thanks      Left detailed message informing patient of imaging results and recommendation to follow up.  Instructed patient to bring his MRI

## 2020-11-18 ENCOUNTER — TELEPHONE (OUTPATIENT)
Dept: FAMILY MEDICINE CLINIC | Facility: CLINIC | Age: 45
End: 2020-11-18

## 2020-11-18 ENCOUNTER — OFFICE VISIT (OUTPATIENT)
Dept: NEUROLOGY | Facility: CLINIC | Age: 45
End: 2020-11-18
Payer: COMMERCIAL

## 2020-11-18 DIAGNOSIS — I26.99 PULMONARY EMBOLISM AND INFARCTION (HCC): ICD-10-CM

## 2020-11-18 DIAGNOSIS — G89.29 CHRONIC BILATERAL LOW BACK PAIN WITH BILATERAL SCIATICA: ICD-10-CM

## 2020-11-18 DIAGNOSIS — M54.41 CHRONIC BILATERAL LOW BACK PAIN WITH BILATERAL SCIATICA: ICD-10-CM

## 2020-11-18 DIAGNOSIS — Z79.01 LONG TERM (CURRENT) USE OF ANTICOAGULANTS: ICD-10-CM

## 2020-11-18 DIAGNOSIS — M62.89 PELVIC FLOOR DYSFUNCTION: Primary | ICD-10-CM

## 2020-11-18 DIAGNOSIS — M54.42 CHRONIC BILATERAL LOW BACK PAIN WITH BILATERAL SCIATICA: ICD-10-CM

## 2020-11-18 PROCEDURE — 99214 OFFICE O/P EST MOD 30 MIN: CPT | Performed by: PHYSICAL MEDICINE & REHABILITATION

## 2020-11-18 PROCEDURE — 99072 ADDL SUPL MATRL&STAF TM PHE: CPT | Performed by: PHYSICAL MEDICINE & REHABILITATION

## 2020-11-18 NOTE — TELEPHONE ENCOUNTER
Per patient requesting a referral for an appointment that he had today / with Dr. Santiago Rincon.  Please advise

## 2020-11-18 NOTE — PROGRESS NOTES
[address]  FOLLOW UP EVALUATION      Chief Complaint: back pain. HISTORY OF PRESENT ILLNESS:   Patient presents with:  Low Back Pain: pt here for follow up after Xray amd L-Spine MRI-disc loaded to PACs. testicles however his low back pain shifted to pain in the bilateral buttocks. His pain is worsened with prolonged sitting and the pain in the right buttock is worse compared to the left.   At that time, he saw a urologist and proctologist and had work-up Outpatient Medications   Medication Sig Dispense Refill   • ubrogepant (UBRELVY) 50 MG Oral Tab Take 50 mg by mouth as needed. • EMGALITY 120 MG/ML Subcutaneous Solution Prefilled Syringe Inject 120 mg into the skin every 30 (thirty) days.  1 Syringe 5 follow 2 step commands, comprehention intact, spontaneous speech intact  Psychiatric: Mood and affect appropriate    Gait Normal  Able to toe walk and heel walk without any difficulty  Posture: Dextroscoliotic curve noted    Musculoskeletal/Neurological Ex reviewed which is notable for a broad-based disc herniation at L4-5 resulting in mild left greater than right foraminal stenosis without any significant central stenosis as well as a mild L5-S1 central disc herniation without any significant spinal or fora learning. Leticia CODY. [address] [facility]  Physical Medicine and Rehabilitation/Sports Medicine

## 2020-11-19 ENCOUNTER — OFFICE VISIT (OUTPATIENT)
Dept: PHYSICAL THERAPY | Age: 45
End: 2020-11-19
Attending: PHYSICAL MEDICINE & REHABILITATION
Payer: COMMERCIAL

## 2020-11-19 PROCEDURE — 97535 SELF CARE MNGMENT TRAINING: CPT

## 2020-11-19 PROCEDURE — 97161 PT EVAL LOW COMPLEX 20 MIN: CPT

## 2020-11-19 PROCEDURE — 97112 NEUROMUSCULAR REEDUCATION: CPT

## 2020-11-19 NOTE — PROGRESS NOTES
PELVIC FLOOR EVALUATION:   Referring Physician: Dr. Soto Loop floor dysfunction (T16.56)  Date of onset: 2019 Date of Service: 11/19/2020     PATIENT SUMMARY   Ronnie Forrester is a 50year old male  who presents to therapy today with comp pain in the perineum. BM every day but feels like the stool is . Stool was looser with caffeine. He started eating more fiber. Nocturia 3x at night but get up 1x at night. Denies any issues with intercourse.  Has been sitting on softer foam.  S 100%achieved 100% maintained  Pain with rectal exam: No    ASSESSMENT  Beatriz Casey presents to physical therapy evaluation with primary c/o testicular pain, LBP, urinary urgency, and fecal incontinence.  Patient reports onset in 2018 after increasing his weightl pain severe restriction and pain    Coccygeus severe restriction and pain severe restriction and pain    Piriformis severe restriction and pain severe restriction and pain    Obturator internus severe restriction and pain severe restriction and pain day period.   Treatment will include: Manual Therapy, Neuromuscular Re-education, Self-Care Home Management, Therapeutic Exercise and Home Exercise Program instruction     Education or treatment limitation: None  Rehab Potential:excellent      Patient was a

## 2020-11-19 NOTE — TELEPHONE ENCOUNTER
Spoke to patient and told him he doesn't need a referral since he has a PPO plan. He states he was told by 's office that he would need a referral every single time he's seen and he thinks that's a bit excessive.  I told him we can send them what we

## 2020-11-19 NOTE — TELEPHONE ENCOUNTER
Per EMR pt has referral placed on 10-20-20, pls inform pt, thanks.          Future Appointments   Date Time Provider Ellis Fox   11/19/2020  9:15 AM Jihan Hooker, [address] [facility]   11/25/2020  4:00 PM Jihan Hooker, [address] [place] [address]

## 2020-11-25 ENCOUNTER — OFFICE VISIT (OUTPATIENT)
Dept: PHYSICAL THERAPY | Age: 45
End: 2020-11-25
Attending: PHYSICAL MEDICINE & REHABILITATION
Payer: COMMERCIAL

## 2020-11-25 PROCEDURE — 97140 MANUAL THERAPY 1/> REGIONS: CPT

## 2020-11-25 NOTE — PROGRESS NOTES
Dx:     Pelvic floor dysfunction (M62.89)    Insurance (Authorized # of Visits):  BCBS 10 per POC          Authorizing Physician: Dr. Damon Valencia  Next MD visit: none scheduled  Fall Risk: standard         Precautions: n/a             Subjective:  Feeling a bit

## 2020-12-03 ENCOUNTER — OFFICE VISIT (OUTPATIENT)
Dept: PHYSICAL THERAPY | Age: 45
End: 2020-12-03
Attending: PHYSICAL MEDICINE & REHABILITATION
Payer: COMMERCIAL

## 2020-12-03 PROCEDURE — 97140 MANUAL THERAPY 1/> REGIONS: CPT

## 2020-12-03 NOTE — PROGRESS NOTES
Dx:     Pelvic floor dysfunction (M62.89)    Insurance (Authorized # of Visits):  BCBS 10 per POC          Authorizing Physician: Dr. Tanja Bosworth  Next MD visit: none scheduled  Fall Risk: standard         Precautions: n/a             Subjective:  Pooping is bet Self care Home management:       HEP: conchis pose, happy baby    Charges: 3man      Total Timed Treatment: 40 min  Total Treatment Time: 45 min

## 2020-12-17 ENCOUNTER — OFFICE VISIT (OUTPATIENT)
Dept: PHYSICAL THERAPY | Age: 45
End: 2020-12-17
Attending: PHYSICAL MEDICINE & REHABILITATION
Payer: COMMERCIAL

## 2020-12-17 PROCEDURE — 97140 MANUAL THERAPY 1/> REGIONS: CPT

## 2020-12-17 NOTE — PROGRESS NOTES
Dx:     Pelvic floor dysfunction (M62.89)    Insurance (Authorized # of Visits):  BCBS 10 per POC          Authorizing Physician: Dr. Papi Blount  Next MD visit: none scheduled  Fall Risk: standard         Precautions: n/a           Subjective:  Doing better.  Jake Rg min

## 2020-12-24 ENCOUNTER — TELEPHONE (OUTPATIENT)
Dept: INTERNAL MEDICINE CLINIC | Facility: CLINIC | Age: 45
End: 2020-12-24

## 2020-12-28 ENCOUNTER — OFFICE VISIT (OUTPATIENT)
Dept: PHYSICAL THERAPY | Age: 45
End: 2020-12-28
Attending: PHYSICAL MEDICINE & REHABILITATION
Payer: COMMERCIAL

## 2020-12-28 PROCEDURE — 97140 MANUAL THERAPY 1/> REGIONS: CPT

## 2020-12-29 NOTE — PROGRESS NOTES
Dx:     Pelvic floor dysfunction (M62.89)    Insurance (Authorized # of Visits):  BCBS 10 per POC          Authorizing Physician: Dr. Nayely Rosario  Next MD visit: none scheduled  Fall Risk: standard         Precautions: n/a           Subjective: Can lift up to 10 Manual: DTM and TP release to R lower abdominals, groin             HEP: PPU    Charges: 3man      Total Timed Treatment: 40 min  Total Treatment Time: 45 min

## 2021-01-04 ENCOUNTER — APPOINTMENT (OUTPATIENT)
Dept: PHYSICAL THERAPY | Age: 46
End: 2021-01-04
Attending: PHYSICAL MEDICINE & REHABILITATION
Payer: COMMERCIAL

## 2021-01-06 ENCOUNTER — OFFICE VISIT (OUTPATIENT)
Dept: PHYSICAL THERAPY | Age: 46
End: 2021-01-06
Attending: PHYSICAL MEDICINE & REHABILITATION
Payer: COMMERCIAL

## 2021-01-06 PROCEDURE — 97140 MANUAL THERAPY 1/> REGIONS: CPT

## 2021-01-06 PROCEDURE — 97112 NEUROMUSCULAR REEDUCATION: CPT

## 2021-01-06 NOTE — PROGRESS NOTES
HPI:    Patient ID: Darryn Levy is a 50year old male.     HPI  Patient presents with:  Routine Physical  Medication Request    Past Medical History:   Diagnosis Date   • DVT (deep venous thrombosis) (HCC)     per ng coumadin   • Esophageal reflux    • Neurological: He is alert and oriented to person, place, and time. He has normal reflexes. Skin: Skin is warm, dry and intact. No rash noted. Psychiatric: He has a normal mood and affect. His mood appears not anxious.  He does not exhibit a depressed Keystone Flap Text: The defect edges were debeveled with a #15 scalpel blade.  Given the location of the defect, shape of the defect a keystone flap was deemed most appropriate.  Using a sterile surgical marker, an appropriate keystone flap was drawn incorporating the defect, outlining the appropriate donor tissue and placing the expected incisions within the relaxed skin tension lines where possible. The area thus outlined was incised deep to adipose tissue with a #15 scalpel blade.  The skin margins were undermined to an appropriate distance in all directions around the primary defect and laterally outward around the flap utilizing iris scissors.

## 2021-01-06 NOTE — PROGRESS NOTES
Dx:     Pelvic floor dysfunction (M62.89)    Insurance (Authorized # of Visits):  BCBS 10 per POC          Authorizing Physician: Dr. Jan Henning  Next MD visit: none scheduled  Fall Risk: standard         Precautions: n/a           Subjective:  Doing press ups. 10x  Reformer I's 5x  Y's 5x   T's 5x        HEP:no change    Charges: 2man, 1NM      Total Timed Treatment: 40 min  Total Treatment Time: 45 min

## 2021-01-11 ENCOUNTER — APPOINTMENT (OUTPATIENT)
Dept: PHYSICAL THERAPY | Age: 46
End: 2021-01-11
Attending: PHYSICAL MEDICINE & REHABILITATION
Payer: COMMERCIAL

## 2021-01-13 ENCOUNTER — OFFICE VISIT (OUTPATIENT)
Dept: PHYSICAL THERAPY | Age: 46
End: 2021-01-13
Attending: PHYSICAL MEDICINE & REHABILITATION
Payer: COMMERCIAL

## 2021-01-13 PROCEDURE — 97140 MANUAL THERAPY 1/> REGIONS: CPT

## 2021-01-13 PROCEDURE — 97112 NEUROMUSCULAR REEDUCATION: CPT

## 2021-01-13 NOTE — PROGRESS NOTES
Dx:     Pelvic floor dysfunction (M62.89)    Insurance (Authorized # of Visits):  BCBS 10 per POC          Authorizing Physician: Dr. Adin Aparicio  Next MD visit: none scheduled  Fall Risk: standard         Precautions: n/a           Subjective:  Abs didn't hurt 10x  Low abs BKFO 10x        HEP:low abs    Charges: 2man, 1NM      Total Timed Treatment: 40 min  Total Treatment Time: 45 min

## 2021-01-14 NOTE — TELEPHONE ENCOUNTER
Spoke with Gerald Tate, discussed importance of monitoring blood levels as foods, alcohol and over the counter medications can affect blood thickness. Admits to missing INR checks due to [address]. [facility] for [facility].

## 2021-01-29 NOTE — TELEPHONE ENCOUNTER
Detailed message left (HIPAA verified) for Kita Alcantar. Noted he cancelled appointment scheduled for 1/26 and did not reschedule. Asked him to return call to [facility] to make an appointment (last INR check was July 2020). Number left to return call to [facility].

## 2021-02-08 ENCOUNTER — TELEPHONE (OUTPATIENT)
Dept: FAMILY MEDICINE CLINIC | Facility: CLINIC | Age: 46
End: 2021-02-08

## 2021-02-08 ENCOUNTER — OFFICE VISIT (OUTPATIENT)
Dept: NEUROLOGY | Facility: CLINIC | Age: 46
End: 2021-02-08
Payer: COMMERCIAL

## 2021-02-08 ENCOUNTER — TELEPHONE (OUTPATIENT)
Dept: NEUROLOGY | Facility: CLINIC | Age: 46
End: 2021-02-08

## 2021-02-08 VITALS
WEIGHT: 155 LBS | DIASTOLIC BLOOD PRESSURE: 72 MMHG | HEART RATE: 68 BPM | HEIGHT: 70 IN | SYSTOLIC BLOOD PRESSURE: 118 MMHG | OXYGEN SATURATION: 100 % | BODY MASS INDEX: 22.19 KG/M2

## 2021-02-08 DIAGNOSIS — Z79.01 LONG TERM (CURRENT) USE OF ANTICOAGULANTS: ICD-10-CM

## 2021-02-08 DIAGNOSIS — M62.89 PELVIC FLOOR DYSFUNCTION: Primary | ICD-10-CM

## 2021-02-08 DIAGNOSIS — Z79.01 ANTICOAGULATED WITH WARFARIN: Primary | ICD-10-CM

## 2021-02-08 DIAGNOSIS — G89.29 CHRONIC BILATERAL LOW BACK PAIN WITH BILATERAL SCIATICA: ICD-10-CM

## 2021-02-08 DIAGNOSIS — M54.41 CHRONIC BILATERAL LOW BACK PAIN WITH BILATERAL SCIATICA: ICD-10-CM

## 2021-02-08 DIAGNOSIS — G43.719 INTRACTABLE CHRONIC MIGRAINE WITHOUT AURA AND WITHOUT STATUS MIGRAINOSUS: ICD-10-CM

## 2021-02-08 DIAGNOSIS — I26.99 PULMONARY EMBOLISM AND INFARCTION (HCC): ICD-10-CM

## 2021-02-08 DIAGNOSIS — M54.42 CHRONIC BILATERAL LOW BACK PAIN WITH BILATERAL SCIATICA: ICD-10-CM

## 2021-02-08 PROCEDURE — 3008F BODY MASS INDEX DOCD: CPT | Performed by: PHYSICAL MEDICINE & REHABILITATION

## 2021-02-08 PROCEDURE — 3074F SYST BP LT 130 MM HG: CPT | Performed by: PHYSICAL MEDICINE & REHABILITATION

## 2021-02-08 PROCEDURE — 99214 OFFICE O/P EST MOD 30 MIN: CPT | Performed by: PHYSICAL MEDICINE & REHABILITATION

## 2021-02-08 PROCEDURE — 3078F DIAST BP <80 MM HG: CPT | Performed by: PHYSICAL MEDICINE & REHABILITATION

## 2021-02-08 RX ORDER — DULOXETIN HYDROCHLORIDE 30 MG/1
30 CAPSULE, DELAYED RELEASE ORAL DAILY
Qty: 30 CAPSULE | Refills: 0 | Status: SHIPPED | OUTPATIENT
Start: 2021-02-08 | End: 2021-04-15 | Stop reason: SINTOL

## 2021-02-08 NOTE — PROGRESS NOTES
[address]  FOLLOW UP EVALUATION      Chief Complaint: back pain. HISTORY OF PRESENT ILLNESS:   Patient presents with:  Low Back Pain: Bilateral low back pain that radiates to glutes.  Patient states it ha tolerance as well as increased ambulating tolerance. However he continues to have pain when he is lifting something heavy that radiates into his testicles. He also has some pain that radiates to the buttock from his lower back.   He denies any radiation d difficulty with urination and urgency. he denies any fevers, chills or weight loss. He denies any reproducible pain with palpation and denies any numbness tingling in his lower extremities.   Patient works as a  and has to use the computer evening. (Patient taking differently: Take 5 mg by mouth. 1 tab three times week and 0.5 tab 4 times week ) 90 tablet 1         ALLERGIES:     Shrimp                  SWELLING      FAMILY HISTORY:   History reviewed. No pertinent family history.        SOCI paraspinals, SI joints  ROM: FAROM with mild pain with extension  Strength: 5/5 in bilateral lower extremities  Sensation: Intact to light touch in all dermatomes of the lower extremities  Reflexes: 1/4 at L4 and S1  Facet Loading: Negative  Straight leg r upon flexion or extension. All imaging results were reviewed and discussed with patient. ASSESSMENT:     1. Pelvic floor dysfunction    2. Chronic bilateral low back pain with bilateral sciatica    3. Long term (current) use of anticoagulants    4.

## 2021-02-08 NOTE — PATIENT INSTRUCTIONS
-My office will call once injection is approved  -Cymbalta daily   -Call us with an update in 2 weeks  -Follow up 2 weeks after injection

## 2021-02-08 NOTE — TELEPHONE ENCOUNTER
Patient, states that he is having a procedure done with Dr. Zeus Rosario next week Monday. Pt states that Dr. Zeus Rosario, would like him to stop taking coumadin/warfarin 2 days before the procedure. Pt would like to confirm that this is ok with the doctor.  Pt states t

## 2021-02-08 NOTE — TELEPHONE ENCOUNTER
Indio MOORE 2:15p CS at [employer] to initiate authorization for Bilateral S1 Transforaminal Epidural Steroid Injection  CPT 73698-92 dx:M54.42 to be done at [facility].  Transferred call to Janes Shah, Nurse Reviewer who Approved request    Status: Approved with

## 2021-02-10 ENCOUNTER — APPOINTMENT (OUTPATIENT)
Dept: PHYSICAL THERAPY | Age: 46
End: 2021-02-10
Attending: PHYSICAL MEDICINE & REHABILITATION
Payer: COMMERCIAL

## 2021-02-10 ENCOUNTER — TELEPHONE (OUTPATIENT)
Dept: PHYSICAL THERAPY | Facility: HOSPITAL | Age: 46
End: 2021-02-10

## 2021-02-10 NOTE — TELEPHONE ENCOUNTER
Patient has been scheduled for a Bilateral S1 Transforaminal Epidural Steroid Injection under fluoroscopy guidance under local anesthesia on 02/15/21 at the [facility]. Medications and allergies reviewed.  Patient informed we will need verbal or written Seneca Nares

## 2021-02-15 ENCOUNTER — OFFICE VISIT (OUTPATIENT)
Dept: SURGERY | Facility: CLINIC | Age: 46
End: 2021-02-15

## 2021-02-15 ENCOUNTER — LAB ENCOUNTER (OUTPATIENT)
Dept: LAB | Facility: HOSPITAL | Age: 46
End: 2021-02-15
Attending: FAMILY MEDICINE
Payer: COMMERCIAL

## 2021-02-15 ENCOUNTER — TELEPHONE (OUTPATIENT)
Dept: NEUROLOGY | Facility: CLINIC | Age: 46
End: 2021-02-15

## 2021-02-15 ENCOUNTER — TELEPHONE (OUTPATIENT)
Dept: FAMILY MEDICINE CLINIC | Facility: CLINIC | Age: 46
End: 2021-02-15

## 2021-02-15 DIAGNOSIS — Z79.01 ANTICOAGULATED WITH WARFARIN: ICD-10-CM

## 2021-02-15 DIAGNOSIS — M54.16 BILATERAL LUMBAR RADICULOPATHY: Primary | ICD-10-CM

## 2021-02-15 LAB
INR BLD: 1.08 (ref 0.9–1.2)
PROTHROMBIN TIME: 13.8 SECONDS (ref 11.8–14.5)

## 2021-02-15 PROCEDURE — 64483 NJX AA&/STRD TFRM EPI L/S 1: CPT | Performed by: PHYSICAL MEDICINE & REHABILITATION

## 2021-02-15 PROCEDURE — 36415 COLL VENOUS BLD VENIPUNCTURE: CPT

## 2021-02-15 PROCEDURE — 85610 PROTHROMBIN TIME: CPT

## 2021-02-15 NOTE — PROGRESS NOTES
[address]    BILATERAL S1 TFESI   NAME:  Stefania Morris    MR #:    [id number] :  2/3/1975     PHYSICIAN:  Dorian Hubbard        Operative Report    DATE OF PROCEDURE: 2/15/2021   PREOPERATIVE DIAGNOSES: 1. Bilateral lumbar ra performed. No blood, fluid, or air was aspirated at anytime.     Manpreet Patient DO, FAAPMR & CAQSM  Physical Medicine and Rehabilitation/Sports Medicine  [address]

## 2021-03-05 ENCOUNTER — TELEPHONE (OUTPATIENT)
Dept: NEUROLOGY | Facility: CLINIC | Age: 46
End: 2021-03-05

## 2021-03-05 ENCOUNTER — TELEMEDICINE (OUTPATIENT)
Dept: NEUROLOGY | Facility: CLINIC | Age: 46
End: 2021-03-05
Payer: COMMERCIAL

## 2021-03-05 DIAGNOSIS — M62.89 PELVIC FLOOR DYSFUNCTION: ICD-10-CM

## 2021-03-05 DIAGNOSIS — M54.41 CHRONIC BILATERAL LOW BACK PAIN WITH BILATERAL SCIATICA: Primary | ICD-10-CM

## 2021-03-05 DIAGNOSIS — Z79.01 LONG TERM (CURRENT) USE OF ANTICOAGULANTS: ICD-10-CM

## 2021-03-05 DIAGNOSIS — G89.29 CHRONIC BILATERAL LOW BACK PAIN WITH BILATERAL SCIATICA: Primary | ICD-10-CM

## 2021-03-05 DIAGNOSIS — M54.42 CHRONIC BILATERAL LOW BACK PAIN WITH BILATERAL SCIATICA: Primary | ICD-10-CM

## 2021-03-05 DIAGNOSIS — I26.99 PULMONARY EMBOLISM AND INFARCTION (HCC): ICD-10-CM

## 2021-03-05 PROCEDURE — 99213 OFFICE O/P EST LOW 20 MIN: CPT | Performed by: PHYSICAL MEDICINE & REHABILITATION

## 2021-03-05 NOTE — PROGRESS NOTES
[address]    Telemedicine Visit - Follow Up Evaluation    Telehealth Verbal Consent   I conducted a telehealth visit with Mikel Fitzpatrick today, 03/05/21, which was completed using two-way, real-time inter improvement in his pain. He feels about 80% better in his pain and function. He is able to sit for prolonged periods and ambulate without any significant discomfort. He does not notice any pain radiating in the genitals either.   He is able to play the [employer] radiates into his testicles. He also has some pain that radiates to the buttock from his lower back. He denies any radiation distally in the lower extremities, any numbness tingling, any loss of bowel bladder control.   Pain is mild in nature although he denies any numbness tingling in his lower extremities.   Patient works as a  and has to use the computer and sit for prolonged periods and has returned back in January for work and at this time he is unsure whether he would be able to sit f ALLERGIES:     Shrimp                  SWELLING      FAMILY HISTORY:   No family history on file.        SOCIAL HISTORY:   Social History    Tobacco Use      Smoking status: Never Smoker      Smokeless tobacco: Never Used    Alcohol use: No    Drug from outside institution was reviewed which is notable for a broad-based disc herniation at L4-5 resulting in mild left greater than right foraminal stenosis without any significant central stenosis as well as a mild L5-S1 central disc herniation without a reaching out to the patient for the elective procedure when it is considered appropriate and the patient can follow-up in office as well when appropriate. In the meantime, I will be available for telephone and video encounter.       The patient verbalized

## 2021-03-11 ENCOUNTER — TELEPHONE (OUTPATIENT)
Dept: INTERNAL MEDICINE CLINIC | Facility: CLINIC | Age: 46
End: 2021-03-11

## 2021-03-17 ENCOUNTER — MED REC SCAN ONLY (OUTPATIENT)
Dept: FAMILY MEDICINE CLINIC | Facility: CLINIC | Age: 46
End: 2021-03-17

## 2021-03-26 NOTE — TELEPHONE ENCOUNTER
siddharth did you put this form in the folders for dr to fill out? They are not in the mailbox. Acitretin Pregnancy And Lactation Text: This medication is Pregnancy Category X and should not be given to women who are pregnant or may become pregnant in the future. This medication is excreted in breast milk.

## 2021-04-02 ENCOUNTER — TELEPHONE (OUTPATIENT)
Dept: INTERNAL MEDICINE CLINIC | Facility: CLINIC | Age: 46
End: 2021-04-02

## 2021-04-02 NOTE — TELEPHONE ENCOUNTER
Patient has been non-compliant with [facility]. Removed from [facility]. Episode of care has been resolved. Standing lab orders have been discontinued. Caution: patient has been non-compliant with [facility] and INR has not been managed.  Pa

## 2021-04-14 NOTE — TELEPHONE ENCOUNTER
Spoke with Al[place]o at [address] for Dexilant 60 mg #90 cancelled   Needs Rx sent to [facility] in [place] per patient   Pended for Dr. Dedrick Coughlin to sign   Thank you 0

## 2021-04-16 RX ORDER — WARFARIN SODIUM 5 MG/1
5 TABLET ORAL NIGHTLY
Qty: 90 TABLET | Refills: 0 | Status: SHIPPED | OUTPATIENT
Start: 2021-04-16 | End: 2021-07-20

## 2021-04-23 ENCOUNTER — TELEMEDICINE (OUTPATIENT)
Dept: NEUROLOGY | Facility: CLINIC | Age: 46
End: 2021-04-23

## 2021-04-23 ENCOUNTER — TELEPHONE (OUTPATIENT)
Dept: NEUROLOGY | Facility: CLINIC | Age: 46
End: 2021-04-23

## 2021-04-23 DIAGNOSIS — Z79.01 ANTICOAGULATED WITH WARFARIN: Primary | ICD-10-CM

## 2021-04-23 DIAGNOSIS — M54.16 BILATERAL LUMBAR RADICULOPATHY: Primary | ICD-10-CM

## 2021-04-23 PROCEDURE — 99214 OFFICE O/P EST MOD 30 MIN: CPT | Performed by: PHYSICAL MEDICINE & REHABILITATION

## 2021-04-23 NOTE — PROGRESS NOTES
[address]    Telemedicine Visit - Follow Up Evaluation    Telehealth Verbal Consent   I conducted a telehealth visit with Lila Thurman today, 04/23/21, which was completed using two-way, real-time inter even with slight weight of 10 pounds when he is doing overhead exercises he has reproduction of the symptoms. The pain can be discomforting and affects his ability to exercise and ambulate.   He has tried amitriptyline, Cymbalta, gabapentin but has not bee significant pathology. This pain is rated at 3 out of 10 today but can affect his ability to function and stay active. He is unable to sit for 30 to 40 minutes before having worsening pain. He has had MRI imaging of the lumbar spine as noted below.   He pain is worsened with prolonged sitting and the pain in the right buttock is worse compared to the left.   At that time, he saw a urologist and proctologist and had work-up for possible prostate pathology and was recommended pelvic floor PT however he never Oral Tab Take 1 tablet (5 mg total) by mouth nightly. 90 tablet 0   • EMGALITY 120 MG/ML Subcutaneous Solution Prefilled Syringe Inject 120 mg into the skin every 30 (thirty) days.  1 Syringe 11   • ubrogepant (UBRELVY) 50 MG Oral Tab Take 50 mg by mouth da AST 36 07/29/2020    ALT 50 07/29/2020    ALKPHOS 85 01/30/2016    BILT 0.6 07/29/2020    TP 7.8 07/29/2020    ALB 4.4 07/29/2020    GLOBULIN 3.4 07/29/2020    AGRATIO 1.8 01/30/2016     07/29/2020    K 4.5 07/29/2020     07/29/2020    CO2 3 increase risk and complications of infection. The patient was advised that given the current situation with COVID-19, it is in his/her best interest to socially distance his/herself.  Given this, we are not recommending any elective procedures or office

## 2021-04-23 NOTE — TELEPHONE ENCOUNTER
Contacted [employer] online to initiate authorization for Bilateral S1 Transforaminal Epidural Steroid Injections CPT 23912-54 dx:M54.16 to be done at [address] [place] [address].     Status: Approved with order # [id number] valid 5/3/21-5/14/21    Routing to clinical staff to schedule

## 2021-04-30 ENCOUNTER — TELEPHONE (OUTPATIENT)
Dept: FAMILY MEDICINE CLINIC | Facility: CLINIC | Age: 46
End: 2021-04-30

## 2021-04-30 NOTE — TELEPHONE ENCOUNTER
Called Dr Alexandra Gardner office [phone] for approval to hold Warfarin ____ days prior to the injection.  Faxed the anticoagulation sheet to [phone]

## 2021-04-30 NOTE — TELEPHONE ENCOUNTER
Dr. Emmanuel Rodriguez office is calling about a letter they faxed over back on 4/26 & 4/28. They need approval from Dr. Tavo Chisholm to hold Miguel Angel Weinstein to get injections. They can not schedule the patient without this letter being signed by the DR.      They are faxing

## 2021-04-30 NOTE — TELEPHONE ENCOUNTER
Have not received any forms ffrom dr Brianda Pena office, called office was closed at 1 pm today on fridays, will call Tuesday  To refaxed

## 2021-05-03 ENCOUNTER — TELEPHONE (OUTPATIENT)
Dept: NEUROLOGY | Facility: CLINIC | Age: 46
End: 2021-05-03

## 2021-05-04 NOTE — TELEPHONE ENCOUNTER
Patient has been scheduled for a Bilateral S1 Transforaminal Epidural Steroid Injection under fluoroscopy guidance under local anesthesia  on 05/24/21 at the [facility]. Medications and allergies reviewed.  Patient informed we will need verbal or written Conda Coats

## 2021-05-04 NOTE — TELEPHONE ENCOUNTER
Bilateral S1 Transforaminal Epidural Steroid Injections CPT 63665-46 dx:M54.16 to be done at [address] spoke to Irwin Tapia who updated valid date for authorization # [id number]     New valid dates:  5/22/21 thru 6/07/21     Notified approved referral

## 2021-05-10 ENCOUNTER — TELEPHONE (OUTPATIENT)
Dept: FAMILY MEDICINE CLINIC | Facility: CLINIC | Age: 46
End: 2021-05-10

## 2021-05-10 NOTE — TELEPHONE ENCOUNTER
Patient is having a dental procedure \"deep cleaning\" on 05/19/21 and 05/26/21 and wants to know if he should stop taking warfarin sodium prior to the appointment

## 2021-05-24 ENCOUNTER — LAB ENCOUNTER (OUTPATIENT)
Dept: LAB | Facility: HOSPITAL | Age: 46
End: 2021-05-24
Attending: PHYSICAL MEDICINE & REHABILITATION
Payer: COMMERCIAL

## 2021-05-24 ENCOUNTER — OFFICE VISIT (OUTPATIENT)
Dept: SURGERY | Facility: CLINIC | Age: 46
End: 2021-05-24

## 2021-05-24 DIAGNOSIS — M54.16 BILATERAL LUMBAR RADICULOPATHY: Primary | ICD-10-CM

## 2021-05-24 DIAGNOSIS — Z79.01 ANTICOAGULATED WITH WARFARIN: ICD-10-CM

## 2021-05-24 PROCEDURE — 64483 NJX AA&/STRD TFRM EPI L/S 1: CPT | Performed by: PHYSICAL MEDICINE & REHABILITATION

## 2021-05-24 PROCEDURE — 85610 PROTHROMBIN TIME: CPT

## 2021-05-24 PROCEDURE — 36415 COLL VENOUS BLD VENIPUNCTURE: CPT

## 2021-05-24 NOTE — PROGRESS NOTES
[address]    BILATERAL S1 TFESI   NAME:  Darryn Levy    MR #:    [id number] :  2/3/1975     PHYSICIAN:  Casi Hubbard        Operative Report    DATE OF PROCEDURE: 2021   PREOPERATIVE DIAGNOSES: 1. Bilateral lumbar ra performed. No blood, fluid, or air was aspirated at anytime.     Umm Dick DO, FAAPMR & CAQSM  Physical Medicine and Rehabilitation/Sports Medicine  [facility]

## 2021-06-10 NOTE — TELEPHONE ENCOUNTER
Pt called to check status of his refill, says pharmacy told him to follow up. Stated its 'bad news\" if he runs out of medicine and he only has a couple days left. Please advise.    DEXILANT 60 MG Oral Capsule Delayed Release [Pharmacy Med Name: [address]

## 2021-07-20 ENCOUNTER — MED REC SCAN ONLY (OUTPATIENT)
Dept: NEUROLOGY | Facility: CLINIC | Age: 46
End: 2021-07-20

## 2021-07-20 ENCOUNTER — OFFICE VISIT (OUTPATIENT)
Dept: FAMILY MEDICINE CLINIC | Facility: CLINIC | Age: 46
End: 2021-07-20
Payer: COMMERCIAL

## 2021-07-20 VITALS
WEIGHT: 155 LBS | SYSTOLIC BLOOD PRESSURE: 128 MMHG | HEART RATE: 66 BPM | HEIGHT: 70 IN | DIASTOLIC BLOOD PRESSURE: 86 MMHG | BODY MASS INDEX: 22.19 KG/M2

## 2021-07-20 DIAGNOSIS — M62.89 PELVIC FLOOR DYSFUNCTION: ICD-10-CM

## 2021-07-20 DIAGNOSIS — Z79.01 ANTICOAGULATED WITH WARFARIN: ICD-10-CM

## 2021-07-20 DIAGNOSIS — K21.9 GASTROESOPHAGEAL REFLUX DISEASE WITHOUT ESOPHAGITIS: ICD-10-CM

## 2021-07-20 DIAGNOSIS — Z00.00 ROUTINE GENERAL MEDICAL EXAMINATION AT A HEALTH CARE FACILITY: Primary | ICD-10-CM

## 2021-07-20 DIAGNOSIS — D68.59 PROTEIN C DEFICIENCY (HCC): ICD-10-CM

## 2021-07-20 DIAGNOSIS — G43.909 MIGRAINE WITHOUT STATUS MIGRAINOSUS, NOT INTRACTABLE, UNSPECIFIED MIGRAINE TYPE: ICD-10-CM

## 2021-07-20 PROCEDURE — 3079F DIAST BP 80-89 MM HG: CPT | Performed by: FAMILY MEDICINE

## 2021-07-20 PROCEDURE — 3008F BODY MASS INDEX DOCD: CPT | Performed by: FAMILY MEDICINE

## 2021-07-20 PROCEDURE — 3074F SYST BP LT 130 MM HG: CPT | Performed by: FAMILY MEDICINE

## 2021-07-20 PROCEDURE — 99396 PREV VISIT EST AGE 40-64: CPT | Performed by: FAMILY MEDICINE

## 2021-07-20 RX ORDER — WARFARIN SODIUM 5 MG/1
5 TABLET ORAL NIGHTLY
Qty: 90 TABLET | Refills: 1 | Status: SHIPPED | OUTPATIENT
Start: 2021-07-20 | End: 2021-07-22

## 2021-07-20 RX ORDER — TRAMADOL HYDROCHLORIDE 50 MG/1
2209900 TABLET ORAL EVERY 6 HOURS PRN
COMMUNITY
Start: 2021-06-23

## 2021-07-21 ENCOUNTER — TELEPHONE (OUTPATIENT)
Dept: FAMILY MEDICINE CLINIC | Facility: CLINIC | Age: 46
End: 2021-07-21

## 2021-07-21 NOTE — TELEPHONE ENCOUNTER
At office visit yesterday, Dr. Shawn Rubio discussed possibly changing pt from warfarin to Eloquis. \" I recommend he see if his insurance cover alternative anticoagulants. \"  Pt was to check to verify that his insurance covers the Eloquis.   He is now calling

## 2021-07-21 NOTE — PROGRESS NOTES
HPI/Subjective:   Patient ID: Patito Wolff is a 48year old male.     HPI  Patient presents with:  Routine Physical: annual physical   Medication Request: for warfin   Referral: colonoscopy     Past Medical History:   Diagnosis Date   • DVT (deep venous Pulmonary effort is normal.      Breath sounds: Normal breath sounds. Abdominal:      General: Bowel sounds are normal.      Palpations: Abdomen is soft. Musculoskeletal:         General: Normal range of motion.       Cervical back: Normal range of ruthann

## 2021-07-22 NOTE — TELEPHONE ENCOUNTER
Sent new med. Stop coumadin. Follow up 3 months. Same potential side effects as coumadin as far as bleeding risks.

## 2021-07-23 ENCOUNTER — LAB ENCOUNTER (OUTPATIENT)
Dept: LAB | Age: 46
End: 2021-07-23
Attending: FAMILY MEDICINE
Payer: COMMERCIAL

## 2021-07-23 DIAGNOSIS — Z00.00 ROUTINE GENERAL MEDICAL EXAMINATION AT A HEALTH CARE FACILITY: ICD-10-CM

## 2021-07-23 LAB
ALBUMIN SERPL-MCNC: 4 G/DL (ref 3.4–5)
ALBUMIN/GLOB SERPL: 1.3 {RATIO} (ref 1–2)
ALP LIVER SERPL-CCNC: 85 U/L
ALT SERPL-CCNC: 71 U/L
ANION GAP SERPL CALC-SCNC: 6 MMOL/L (ref 0–18)
AST SERPL-CCNC: 42 U/L (ref 15–37)
BASOPHILS # BLD AUTO: 0.06 X10(3) UL (ref 0–0.2)
BASOPHILS NFR BLD AUTO: 1 %
BILIRUB SERPL-MCNC: 1 MG/DL (ref 0.1–2)
BUN BLD-MCNC: 17 MG/DL (ref 7–18)
BUN/CREAT SERPL: 15.3 (ref 10–20)
CALCIUM BLD-MCNC: 9.6 MG/DL (ref 8.5–10.1)
CHLORIDE SERPL-SCNC: 105 MMOL/L (ref 98–112)
CHOLEST SMN-MCNC: 246 MG/DL (ref ?–200)
CO2 SERPL-SCNC: 28 MMOL/L (ref 21–32)
CREAT BLD-MCNC: 1.11 MG/DL
DEPRECATED RDW RBC AUTO: 38.7 FL (ref 35.1–46.3)
EOSINOPHIL # BLD AUTO: 0.39 X10(3) UL (ref 0–0.7)
EOSINOPHIL NFR BLD AUTO: 6.5 %
ERYTHROCYTE [DISTWIDTH] IN BLOOD BY AUTOMATED COUNT: 12.1 % (ref 11–15)
GLOBULIN PLAS-MCNC: 3.1 G/DL (ref 2.8–4.4)
GLUCOSE BLD-MCNC: 90 MG/DL (ref 70–99)
HCT VFR BLD AUTO: 43.8 %
HDLC SERPL-MCNC: 39 MG/DL (ref 40–59)
HGB BLD-MCNC: 15 G/DL
IMM GRANULOCYTES # BLD AUTO: 0.02 X10(3) UL (ref 0–1)
IMM GRANULOCYTES NFR BLD: 0.3 %
LDLC SERPL CALC-MCNC: 165 MG/DL (ref ?–100)
LYMPHOCYTES # BLD AUTO: 3.74 X10(3) UL (ref 1–4)
LYMPHOCYTES NFR BLD AUTO: 62.8 %
M PROTEIN MFR SERPL ELPH: 7.1 G/DL (ref 6.4–8.2)
MCH RBC QN AUTO: 29.9 PG (ref 26–34)
MCHC RBC AUTO-ENTMCNC: 34.2 G/DL (ref 31–37)
MCV RBC AUTO: 87.3 FL
MONOCYTES # BLD AUTO: 0.42 X10(3) UL (ref 0.1–1)
MONOCYTES NFR BLD AUTO: 7 %
NEUTROPHILS # BLD AUTO: 1.33 X10 (3) UL (ref 1.5–7.7)
NEUTROPHILS # BLD AUTO: 1.33 X10(3) UL (ref 1.5–7.7)
NEUTROPHILS NFR BLD AUTO: 22.4 %
NONHDLC SERPL-MCNC: 207 MG/DL (ref ?–130)
OSMOLALITY SERPL CALC.SUM OF ELEC: 289 MOSM/KG (ref 275–295)
PATIENT FASTING Y/N/NP: YES
PATIENT FASTING Y/N/NP: YES
PLATELET # BLD AUTO: 275 10(3)UL (ref 150–450)
POTASSIUM SERPL-SCNC: 4.4 MMOL/L (ref 3.5–5.1)
RBC # BLD AUTO: 5.02 X10(6)UL
SODIUM SERPL-SCNC: 139 MMOL/L (ref 136–145)
TRIGL SERPL-MCNC: 226 MG/DL (ref 30–149)
VLDLC SERPL CALC-MCNC: 45 MG/DL (ref 0–30)
WBC # BLD AUTO: 6 X10(3) UL (ref 4–11)

## 2021-07-23 PROCEDURE — 85025 COMPLETE CBC W/AUTO DIFF WBC: CPT

## 2021-07-23 PROCEDURE — 36415 COLL VENOUS BLD VENIPUNCTURE: CPT

## 2021-07-23 PROCEDURE — 80061 LIPID PANEL: CPT

## 2021-07-23 PROCEDURE — 80053 COMPREHEN METABOLIC PANEL: CPT

## 2021-07-23 NOTE — TELEPHONE ENCOUNTER
Dr. Rachel Bond: Patient informed Nola has been sent and stop coumadin. Please advise. Patient was told by dental office to stop warfarin starting tomorrow due to Dental procedure he is schedule 7/28/21.      Can patient start eliquis or should he hold o

## 2021-07-24 NOTE — TELEPHONE ENCOUNTER
Spoke to pt and Dr Alexandra Gardner recommendations given. Per pt \"I am having the same procedure on the other side of my mouth in a few weeks and I think Dr Catrachita Sabillon told me I just need to hold the Eliquis the day before he procedure\"    Please advise.

## 2021-07-26 NOTE — TELEPHONE ENCOUNTER
Verified pt name and . Reviewed doctor's recommendations as noted below. Pt states he stopped taking the Warfarin 5 mg as advised by Dr. Heena Carl and he will start taking the Eliquis as advised below.  Also advised pt to call back for med instructions any

## 2021-10-11 ENCOUNTER — TELEPHONE (OUTPATIENT)
Dept: FAMILY MEDICINE CLINIC | Facility: CLINIC | Age: 46
End: 2021-10-11

## 2021-10-11 ENCOUNTER — TELEMEDICINE (OUTPATIENT)
Dept: FAMILY MEDICINE CLINIC | Facility: CLINIC | Age: 46
End: 2021-10-11

## 2021-10-11 ENCOUNTER — TELEPHONE (OUTPATIENT)
Dept: INTERNAL MEDICINE CLINIC | Facility: CLINIC | Age: 46
End: 2021-10-11

## 2021-10-11 DIAGNOSIS — D68.59 PROTEIN C DEFICIENCY (HCC): Primary | ICD-10-CM

## 2021-10-11 DIAGNOSIS — Z51.81 ENCOUNTER FOR THERAPEUTIC DRUG MONITORING: ICD-10-CM

## 2021-10-11 DIAGNOSIS — D68.9 COAGULATION DEFECT (HCC): Primary | ICD-10-CM

## 2021-10-11 DIAGNOSIS — Z79.01 LONG TERM (CURRENT) USE OF ANTICOAGULANTS: ICD-10-CM

## 2021-10-11 PROCEDURE — 99213 OFFICE O/P EST LOW 20 MIN: CPT | Performed by: FAMILY MEDICINE

## 2021-10-11 RX ORDER — WARFARIN SODIUM 5 MG/1
5 TABLET ORAL NIGHTLY
Qty: 30 TABLET | Refills: 0 | Status: SHIPPED | OUTPATIENT
Start: 2021-10-11 | End: 2021-11-08

## 2021-10-11 NOTE — PROGRESS NOTES
This is a telemedicine visit with live, interactive video and audio. Patient understands and accepts financial responsibility for any deductible, co-insurance and/or co-pays associated with this service.     SUBJECTIVE  Pt experiencing abdominal discomf Tab; Take 1 tablet (5 mg total) by mouth nightly. agree to change back to coumadin for one month and see if his symptoms resolve. He can consider going back to the xarelto if he does not notice difference.        Damir Barnard, DO

## 2021-10-11 NOTE — TELEPHONE ENCOUNTER
Patient stated he is still in the virtual waiting room for his 11:45 am video appt. Patient would like to know when the doctor will be able to connect with him. Please advise.

## 2021-10-11 NOTE — TELEPHONE ENCOUNTER
Jeri Glass pt stated that he feels that the apixaban 2.5 MG Oral Tab is giving him  stomach pains/discomfort patient is not sure. He started feeling the stomach discomfort for abd about 1 month and a half. Pt had a hard time describing it.  He stated that a

## 2021-10-14 NOTE — TELEPHONE ENCOUNTER
Chart reviewed. PCP recommends for patient to switch from apixaban to warfarin for 1 month to determine if there is any change with his stomach pain. Warfarin ordered to start 5mg nightly.      Detailed message left (HIPAA verified) for Leticia Hi and asked him

## 2021-10-14 NOTE — TELEPHONE ENCOUNTER
Spoke with Sophie Davidson. States he resumed taking warfarin this past Wednesday 10/13. [facility] appointment scheduled for Tuesday 10/19.

## 2021-10-19 ENCOUNTER — ANTI-COAG VISIT (OUTPATIENT)
Dept: INTERNAL MEDICINE CLINIC | Facility: CLINIC | Age: 46
End: 2021-10-19
Payer: COMMERCIAL

## 2021-10-19 DIAGNOSIS — Z51.81 ENCOUNTER FOR THERAPEUTIC DRUG MONITORING: ICD-10-CM

## 2021-10-19 DIAGNOSIS — Z79.01 LONG TERM (CURRENT) USE OF ANTICOAGULANTS: ICD-10-CM

## 2021-10-19 DIAGNOSIS — D68.9 COAGULATION DEFECT (HCC): ICD-10-CM

## 2021-10-19 DIAGNOSIS — D68.59 PROTEIN C DEFICIENCY (HCC): ICD-10-CM

## 2021-10-19 PROCEDURE — 93793 ANTICOAG MGMT PT WARFARIN: CPT

## 2021-10-19 PROCEDURE — 85610 PROTHROMBIN TIME: CPT

## 2021-10-20 ENCOUNTER — TELEPHONE (OUTPATIENT)
Dept: INTERNAL MEDICINE CLINIC | Facility: CLINIC | Age: 46
End: 2021-10-20

## 2021-10-20 NOTE — TELEPHONE ENCOUNTER
Yesterday's INR= 2.7  Goal= 2.0-3.0    Recently switched from Apixaban to warfarin as he was experiencing stomach aches. Restarted warfarin on 10/14. Would like to keep in his diet a salad daily and cranberry juice occasionally.  Re-educated on diet and vit

## 2021-10-26 ENCOUNTER — ANTI-COAG VISIT (OUTPATIENT)
Dept: INTERNAL MEDICINE CLINIC | Facility: CLINIC | Age: 46
End: 2021-10-26
Payer: COMMERCIAL

## 2021-10-26 ENCOUNTER — TELEPHONE (OUTPATIENT)
Dept: INTERNAL MEDICINE CLINIC | Facility: CLINIC | Age: 46
End: 2021-10-26

## 2021-10-26 DIAGNOSIS — D68.9 COAGULATION DEFECT (HCC): ICD-10-CM

## 2021-10-26 DIAGNOSIS — Z79.01 LONG TERM (CURRENT) USE OF ANTICOAGULANTS: ICD-10-CM

## 2021-10-26 DIAGNOSIS — Z51.81 ENCOUNTER FOR THERAPEUTIC DRUG MONITORING: ICD-10-CM

## 2021-10-26 DIAGNOSIS — D68.59 PROTEIN C DEFICIENCY (HCC): ICD-10-CM

## 2021-10-26 PROCEDURE — 85610 PROTHROMBIN TIME: CPT

## 2021-10-26 PROCEDURE — 93793 ANTICOAG MGMT PT WARFARIN: CPT

## 2021-10-26 NOTE — TELEPHONE ENCOUNTER
Today's INR= 3.3  Goal= 2.0-3.0    Recently switched from Apixaban to warfarin as he was experiencing stomach aches. Restarted warfarin on 10/14. Keeping consistent with greens/velennoxies- has a salad every night. Per protocol, weekly dose decrease 5-10%.

## 2021-11-02 ENCOUNTER — ANTI-COAG VISIT (OUTPATIENT)
Dept: INTERNAL MEDICINE CLINIC | Facility: CLINIC | Age: 46
End: 2021-11-02
Payer: COMMERCIAL

## 2021-11-02 DIAGNOSIS — Z79.01 LONG TERM (CURRENT) USE OF ANTICOAGULANTS: ICD-10-CM

## 2021-11-02 DIAGNOSIS — Z51.81 ENCOUNTER FOR THERAPEUTIC DRUG MONITORING: ICD-10-CM

## 2021-11-02 DIAGNOSIS — D68.9 COAGULATION DEFECT (HCC): ICD-10-CM

## 2021-11-02 DIAGNOSIS — D68.59 PROTEIN C DEFICIENCY (HCC): ICD-10-CM

## 2021-11-02 PROCEDURE — 93793 ANTICOAG MGMT PT WARFARIN: CPT

## 2021-11-02 PROCEDURE — 85610 PROTHROMBIN TIME: CPT

## 2021-11-08 ENCOUNTER — TELEMEDICINE (OUTPATIENT)
Dept: FAMILY MEDICINE CLINIC | Facility: CLINIC | Age: 46
End: 2021-11-08
Payer: COMMERCIAL

## 2021-11-08 DIAGNOSIS — D68.59 PROTEIN C DEFICIENCY (HCC): Primary | ICD-10-CM

## 2021-11-08 DIAGNOSIS — D68.9 COAGULATION DEFECT (HCC): ICD-10-CM

## 2021-11-08 DIAGNOSIS — K21.9 GASTROESOPHAGEAL REFLUX DISEASE, UNSPECIFIED WHETHER ESOPHAGITIS PRESENT: ICD-10-CM

## 2021-11-08 PROCEDURE — 99213 OFFICE O/P EST LOW 20 MIN: CPT | Performed by: FAMILY MEDICINE

## 2021-11-08 RX ORDER — WARFARIN SODIUM 5 MG/1
TABLET ORAL
Qty: 90 TABLET | Refills: 3 | Status: SHIPPED | OUTPATIENT
Start: 2021-11-08

## 2021-11-08 NOTE — PROGRESS NOTES
This is a telemedicine visit with live, interactive video and audio. Patient understands and accepts financial responsibility for any deductible, co-insurance and/or co-pays associated with this service.     SUBJECTIVE  Patient connects for follow up si esophagitis present    Other orders  -     warfarin 5 MG Oral Tab; 5mg M W F and 2.5mg all other days    He will continue warfarin and follow up INR. Well controlled at this time.         Willow Rivera DO

## 2021-11-15 ENCOUNTER — TELEPHONE (OUTPATIENT)
Dept: FAMILY MEDICINE CLINIC | Facility: CLINIC | Age: 46
End: 2021-11-15

## 2021-11-15 NOTE — TELEPHONE ENCOUNTER
Madina from [employer] is looking to see if the patient has done a micro albumin test in the last 12 months.

## 2021-11-17 ENCOUNTER — TELEPHONE (OUTPATIENT)
Dept: FAMILY MEDICINE CLINIC | Facility: CLINIC | Age: 46
End: 2021-11-17

## 2021-11-17 NOTE — TELEPHONE ENCOUNTER
Spoke with Gerald Tate. States he has been taking ultram for the past 6 months and is starting to have some stomach pain- MD instructed him to take a magnesium supplement. He is calling to see if there is an interaction between warfarin and magnesium.      Per d

## 2021-11-23 ENCOUNTER — ANTI-COAG VISIT (OUTPATIENT)
Dept: INTERNAL MEDICINE CLINIC | Facility: CLINIC | Age: 46
End: 2021-11-23
Payer: COMMERCIAL

## 2021-11-23 DIAGNOSIS — D68.59 PROTEIN C DEFICIENCY (HCC): ICD-10-CM

## 2021-11-23 DIAGNOSIS — Z79.01 LONG TERM (CURRENT) USE OF ANTICOAGULANTS: ICD-10-CM

## 2021-11-23 DIAGNOSIS — D68.9 COAGULATION DEFECT (HCC): ICD-10-CM

## 2021-11-23 DIAGNOSIS — Z51.81 ENCOUNTER FOR THERAPEUTIC DRUG MONITORING: ICD-10-CM

## 2021-11-23 PROCEDURE — 93793 ANTICOAG MGMT PT WARFARIN: CPT

## 2021-11-23 PROCEDURE — 85610 PROTHROMBIN TIME: CPT

## 2021-12-02 ENCOUNTER — TELEPHONE (OUTPATIENT)
Dept: FAMILY MEDICINE CLINIC | Facility: CLINIC | Age: 46
End: 2021-12-02

## 2021-12-02 NOTE — TELEPHONE ENCOUNTER
Patient has been taking Dexilant for 7-8 years. Doesn't think it will be covered in January 2022. Patient will find out from pharmacy what other medications are comparable in the event of insurance not covering this medication.     Patient had more ques

## 2021-12-03 ENCOUNTER — TELEMEDICINE (OUTPATIENT)
Dept: FAMILY MEDICINE CLINIC | Facility: CLINIC | Age: 46
End: 2021-12-03
Payer: COMMERCIAL

## 2021-12-03 DIAGNOSIS — K21.9 GASTROESOPHAGEAL REFLUX DISEASE, UNSPECIFIED WHETHER ESOPHAGITIS PRESENT: Primary | ICD-10-CM

## 2021-12-03 PROCEDURE — 99213 OFFICE O/P EST LOW 20 MIN: CPT | Performed by: FAMILY MEDICINE

## 2021-12-03 RX ORDER — ESOMEPRAZOLE MAGNESIUM 40 MG/1
40 CAPSULE, DELAYED RELEASE ORAL
Qty: 15 CAPSULE | Refills: 0 | Status: SHIPPED | OUTPATIENT
Start: 2021-12-03

## 2021-12-03 NOTE — PROGRESS NOTES
This is a telemedicine visit with live, interactive video and audio. Patient understands and accepts financial responsibility for any deductible, co-insurance and/or co-pays associated with this service.     SUBJECTIVE  Pt connects to discuss GERD sympt MG Oral Tab Take 50 mg by mouth daily as needed (headache). 10 tablet 11       OBJECTIVE  Physical Exam:   alert, appears stated age and cooperative    I spent a total of 10 minutes on the video visit.      ASSESSMENT & PLAN  There are no diagnoses linked t

## 2022-01-04 ENCOUNTER — ANTI-COAG VISIT (OUTPATIENT)
Dept: INTERNAL MEDICINE CLINIC | Facility: CLINIC | Age: 47
End: 2022-01-04
Payer: COMMERCIAL

## 2022-01-04 DIAGNOSIS — Z51.81 ENCOUNTER FOR THERAPEUTIC DRUG MONITORING: ICD-10-CM

## 2022-01-04 DIAGNOSIS — D68.59 PROTEIN C DEFICIENCY (HCC): ICD-10-CM

## 2022-01-04 DIAGNOSIS — D68.9 COAGULATION DEFECT (HCC): ICD-10-CM

## 2022-01-04 DIAGNOSIS — Z79.01 LONG TERM (CURRENT) USE OF ANTICOAGULANTS: ICD-10-CM

## 2022-01-04 LAB — INR: 2.5 (ref 0.8–1.2)

## 2022-01-04 PROCEDURE — 93793 ANTICOAG MGMT PT WARFARIN: CPT

## 2022-01-04 PROCEDURE — 85610 PROTHROMBIN TIME: CPT

## 2022-02-15 ENCOUNTER — ANTI-COAG VISIT (OUTPATIENT)
Dept: INTERNAL MEDICINE CLINIC | Facility: CLINIC | Age: 47
End: 2022-02-15
Payer: COMMERCIAL

## 2022-02-15 DIAGNOSIS — Z79.01 LONG TERM (CURRENT) USE OF ANTICOAGULANTS: ICD-10-CM

## 2022-02-15 DIAGNOSIS — Z51.81 ENCOUNTER FOR THERAPEUTIC DRUG MONITORING: ICD-10-CM

## 2022-02-15 DIAGNOSIS — D68.59 PROTEIN C DEFICIENCY (HCC): ICD-10-CM

## 2022-02-15 DIAGNOSIS — D68.9 COAGULATION DEFECT (HCC): ICD-10-CM

## 2022-02-15 LAB
INR: 2.5 (ref 0.8–1.2)
TEST STRIP EXPIRATION DATE: ABNORMAL DATE

## 2022-02-15 PROCEDURE — 85610 PROTHROMBIN TIME: CPT

## 2022-02-15 PROCEDURE — 93793 ANTICOAG MGMT PT WARFARIN: CPT

## 2022-02-22 ENCOUNTER — TELEPHONE (OUTPATIENT)
Dept: FAMILY MEDICINE CLINIC | Facility: CLINIC | Age: 47
End: 2022-02-22

## 2022-02-22 RX ORDER — ENOXAPARIN SODIUM 100 MG/ML
30 INJECTION SUBCUTANEOUS EVERY 12 HOURS SCHEDULED
Status: SHIPPED | OUTPATIENT
Start: 2022-03-10

## 2022-02-22 NOTE — TELEPHONE ENCOUNTER
Advised patient of Dr Neves No note. Patient verbalized understanding. He is requesting his message be sent in a [place] for his reference.

## 2022-02-22 NOTE — TELEPHONE ENCOUNTER
Action Requested: Summary for Provider     []  Critical Lab, Recommendations Needed  [x] Need Additional Advice  []   FYI    []   Need Orders  [] Need Medications Sent to Pharmacy  []  Other     SUMMARY:   Patient called in to inform PCP that he has an EGD and colonoscopy scheduled for 3/17/22 at [facility] by Dr Francis Gray. Patient was told by dr Francis Gray to stop taking the wafarin for a week prior to the procedure. Patient is wondering if he should bridge the warfarin with Lovenox injection. Confirmed pharmacy on profile.     Please advise    Reason for call: Medication Question  Onset: Data Unavailable

## 2022-02-22 NOTE — TELEPHONE ENCOUNTER
Yes , would send lovenox to take for the week he is off comadin. Then restart coumadin the following day and retest INR within one week.

## 2022-02-23 NOTE — TELEPHONE ENCOUNTER
Spoke with [employer] [employer]. Rescheduled [facility] appointment to 3/29. Understands to resume warfarin post procedure.

## 2022-03-07 DIAGNOSIS — Z01.812 ENCOUNTER FOR PREPROCEDURE SCREENING LABORATORY TESTING FOR COVID-19: Primary | ICD-10-CM

## 2022-03-07 DIAGNOSIS — Z11.52 ENCOUNTER FOR PREPROCEDURE SCREENING LABORATORY TESTING FOR COVID-19: Primary | ICD-10-CM

## 2022-03-08 ENCOUNTER — TELEPHONE (OUTPATIENT)
Dept: FAMILY MEDICINE CLINIC | Facility: CLINIC | Age: 47
End: 2022-03-08

## 2022-03-09 RX ORDER — OMEPRAZOLE 40 MG/1
40 CAPSULE, DELAYED RELEASE ORAL
Qty: 30 CAPSULE | Refills: 0 | Status: SHIPPED | OUTPATIENT
Start: 2022-03-09

## 2022-03-10 NOTE — TELEPHONE ENCOUNTER
[facility] states he called his pharmacy to see if Lovenox was ready to be picked up, but informed it is not there. Noted in patient's chart an order for Lovenox, but appears as though it was \"printed. \"  Patient asking if he needs to come into the office to  script, or if this cannot just be sent to his pharmacy. Patient states he'll need the Lovenox no later than this evening.

## 2022-03-11 RX ORDER — ENOXAPARIN SODIUM 100 MG/ML
30 INJECTION SUBCUTANEOUS DAILY
Qty: 1 EACH | Refills: 0 | Status: SHIPPED | OUTPATIENT
Start: 2022-03-11 | End: 2022-03-18

## 2022-03-11 RX ORDER — ENOXAPARIN SODIUM 100 MG/ML
30 INJECTION SUBCUTANEOUS DAILY
Qty: 1 EACH | Refills: 0 | Status: SHIPPED | OUTPATIENT
Start: 2022-03-11 | End: 2022-03-11

## 2022-03-11 RX ORDER — WARFARIN SODIUM 5 MG/1
5 TABLET ORAL DAILY
COMMUNITY

## 2022-03-11 NOTE — TELEPHONE ENCOUNTER
Check that sent correctly - looks like prior sent like it was done in clinic. Also - needs quantity enough for one week I believe.

## 2022-03-11 NOTE — TELEPHONE ENCOUNTER
Patient states he was supposed to start a  Lovenox yesterday and medication is not at the pharmacy. Medication shows as printed. Please advise if patient is supposed to  medication or if this can be sent electronically.

## 2022-03-14 ENCOUNTER — HOSPITAL ENCOUNTER (OUTPATIENT)
Dept: LAB | Age: 47
Discharge: HOME | End: 2022-03-14
Attending: INTERNAL MEDICINE

## 2022-03-14 DIAGNOSIS — Z01.812 ENCOUNTER FOR PREPROCEDURE SCREENING LABORATORY TESTING FOR COVID-19: ICD-10-CM

## 2022-03-14 DIAGNOSIS — Z11.52 ENCOUNTER FOR PREPROCEDURE SCREENING LABORATORY TESTING FOR COVID-19: ICD-10-CM

## 2022-03-14 PROCEDURE — U0003 INFECTIOUS AGENT DETECTION BY NUCLEIC ACID (DNA OR RNA); SEVERE ACUTE RESPIRATORY SYNDROME CORONAVIRUS 2 (SARS-COV-2) (CORONAVIRUS DISEASE [COVID-19]), AMPLIFIED PROBE TECHNIQUE, MAKING USE OF HIGH THROUGHPUT TECHNOLOGIES AS DESCRIBED BY CMS-2020-01-R: HCPCS

## 2022-03-15 LAB
SARS-COV-2 RNA RESP QL NAA+PROBE: NOT DETECTED
SERVICE CMNT-IMP: NORMAL
SERVICE CMNT-IMP: NORMAL

## 2022-03-15 RX ORDER — DEXLANSOPRAZOLE 60 MG/1
CAPSULE, DELAYED RELEASE ORAL
COMMUNITY

## 2022-03-15 RX ORDER — GALCANEZUMAB 120 MG/ML
INJECTION, SOLUTION SUBCUTANEOUS
COMMUNITY
Start: 2022-02-22

## 2022-03-17 ENCOUNTER — HOSPITAL ENCOUNTER (OUTPATIENT)
Dept: GASTROENTEROLOGY | Age: 47
Discharge: HOME | End: 2022-03-17
Attending: INTERNAL MEDICINE

## 2022-03-17 ENCOUNTER — ANESTHESIA EVENT (OUTPATIENT)
Dept: GASTROENTEROLOGY | Age: 47
End: 2022-03-17

## 2022-03-17 ENCOUNTER — ANESTHESIA (OUTPATIENT)
Dept: GASTROENTEROLOGY | Age: 47
End: 2022-03-17

## 2022-03-17 VITALS
TEMPERATURE: 98.6 F | BODY MASS INDEX: 22.76 KG/M2 | HEART RATE: 69 BPM | DIASTOLIC BLOOD PRESSURE: 82 MMHG | WEIGHT: 158.95 LBS | HEIGHT: 70 IN | OXYGEN SATURATION: 100 % | SYSTOLIC BLOOD PRESSURE: 127 MMHG | RESPIRATION RATE: 11 BRPM

## 2022-03-17 DIAGNOSIS — Z12.11 ENCOUNTER FOR SCREENING FOR MALIGNANT NEOPLASM OF COLON: ICD-10-CM

## 2022-03-17 DIAGNOSIS — K21.9 GASTROESOPHAGEAL REFLUX DISEASE WITHOUT ESOPHAGITIS: ICD-10-CM

## 2022-03-17 DIAGNOSIS — K44.9 HIATAL HERNIA: ICD-10-CM

## 2022-03-17 DIAGNOSIS — K29.60 EROSIVE GASTRITIS: ICD-10-CM

## 2022-03-17 PROCEDURE — 10002807 HB RX 258

## 2022-03-17 PROCEDURE — C1726 CATH, BAL DIL, NON-VASCULAR: HCPCS

## 2022-03-17 PROCEDURE — 10006023 HB SUPPLY 272

## 2022-03-17 PROCEDURE — 10002800 HB RX 250 W HCPCS

## 2022-03-17 PROCEDURE — 10002807 HB RX 258: Performed by: INTERNAL MEDICINE

## 2022-03-17 PROCEDURE — 88305 TISSUE EXAM BY PATHOLOGIST: CPT | Performed by: INTERNAL MEDICINE

## 2022-03-17 PROCEDURE — 13000001 HB PHASE II RECOVERY EA 30 MINUTES

## 2022-03-17 PROCEDURE — 10004451 HB PACU RECOVERY 1ST 30 MINUTES

## 2022-03-17 PROCEDURE — 13000029 HB GI MAJOR COMPLEX CASE EA ADD MINUTE

## 2022-03-17 PROCEDURE — 13000028 HB GI MAJOR COMPLEX CASE S/U + 1ST 15 MIN

## 2022-03-17 PROCEDURE — 10002801 HB RX 250 W/O HCPCS

## 2022-03-17 PROCEDURE — 13000008 HB ANESTHESIA MAC OUTSIDE OR

## 2022-03-17 RX ORDER — PROPOFOL 10 MG/ML
INJECTION, EMULSION INTRAVENOUS PRN
Status: DISCONTINUED | OUTPATIENT
Start: 2022-03-17 | End: 2022-03-17

## 2022-03-17 RX ORDER — SODIUM CHLORIDE 9 MG/ML
INJECTION, SOLUTION INTRAVENOUS CONTINUOUS
Status: DISCONTINUED | OUTPATIENT
Start: 2022-03-17 | End: 2022-03-19 | Stop reason: HOSPADM

## 2022-03-17 RX ORDER — MIDAZOLAM HYDROCHLORIDE 1 MG/ML
INJECTION, SOLUTION INTRAMUSCULAR; INTRAVENOUS PRN
Status: DISCONTINUED | OUTPATIENT
Start: 2022-03-17 | End: 2022-03-17

## 2022-03-17 RX ORDER — ENOXAPARIN SODIUM 100 MG/ML
30 INJECTION SUBCUTANEOUS
COMMUNITY

## 2022-03-17 RX ORDER — SODIUM CHLORIDE, SODIUM LACTATE, POTASSIUM CHLORIDE, CALCIUM CHLORIDE 600; 310; 30; 20 MG/100ML; MG/100ML; MG/100ML; MG/100ML
INJECTION, SOLUTION INTRAVENOUS CONTINUOUS PRN
Status: DISCONTINUED | OUTPATIENT
Start: 2022-03-17 | End: 2022-03-17

## 2022-03-17 RX ADMIN — MIDAZOLAM HYDROCHLORIDE 2 MG: 1 INJECTION, SOLUTION INTRAMUSCULAR; INTRAVENOUS at 08:05

## 2022-03-17 RX ADMIN — SODIUM CHLORIDE: 9 INJECTION, SOLUTION INTRAVENOUS at 07:57

## 2022-03-17 RX ADMIN — SODIUM CHLORIDE, POTASSIUM CHLORIDE, SODIUM LACTATE AND CALCIUM CHLORIDE: 600; 310; 30; 20 INJECTION, SOLUTION INTRAVENOUS at 08:00

## 2022-03-17 RX ADMIN — PROPOFOL 75 MCG/KG/MIN: 10 INJECTION, EMULSION INTRAVENOUS at 08:05

## 2022-03-17 RX ADMIN — PROPOFOL 20 MG: 10 INJECTION, EMULSION INTRAVENOUS at 08:05

## 2022-03-17 RX ADMIN — FENTANYL CITRATE 50 MCG: 50 INJECTION, SOLUTION INTRAMUSCULAR; INTRAVENOUS at 08:05

## 2022-03-17 RX ADMIN — FENTANYL CITRATE 50 MCG: 50 INJECTION, SOLUTION INTRAMUSCULAR; INTRAVENOUS at 08:12

## 2022-03-17 ASSESSMENT — PAIN SCALES - WONG BAKER
WONGBAKER_NUMERICALRESPONSE: 0
WONGBAKER_NUMERICALRESPONSE: 0

## 2022-03-17 ASSESSMENT — PAIN SCALES - GENERAL
PAINLEVEL_OUTOF10: 0

## 2022-03-17 ASSESSMENT — ACTIVITIES OF DAILY LIVING (ADL)
ADL_SCORE: 12
ADL_BEFORE_ADMISSION: INDEPENDENT
HISTORY OF FALLING IN THE LAST YEAR (PRIOR TO ADMISSION): NO

## 2022-03-21 LAB
ASR DISCLAIMER: NORMAL
CASE RPRT: NORMAL
CLINICAL INFO: NORMAL
PATH REPORT.FINAL DX SPEC: NORMAL
PATH REPORT.GROSS SPEC: NORMAL

## 2022-03-29 ENCOUNTER — ANTI-COAG VISIT (OUTPATIENT)
Dept: INTERNAL MEDICINE CLINIC | Facility: CLINIC | Age: 47
End: 2022-03-29
Payer: COMMERCIAL

## 2022-03-29 DIAGNOSIS — Z79.01 LONG TERM (CURRENT) USE OF ANTICOAGULANTS: ICD-10-CM

## 2022-03-29 DIAGNOSIS — D68.9 COAGULATION DEFECT (HCC): ICD-10-CM

## 2022-03-29 DIAGNOSIS — Z51.81 ENCOUNTER FOR THERAPEUTIC DRUG MONITORING: ICD-10-CM

## 2022-03-29 DIAGNOSIS — D68.59 PROTEIN C DEFICIENCY (HCC): ICD-10-CM

## 2022-03-29 LAB
INR: 2 (ref 0.8–1.2)
TEST STRIP EXPIRATION DATE: ABNORMAL DATE

## 2022-03-29 PROCEDURE — 93793 ANTICOAG MGMT PT WARFARIN: CPT

## 2022-03-29 PROCEDURE — 85610 PROTHROMBIN TIME: CPT

## 2022-04-07 NOTE — TELEPHONE ENCOUNTER
Refill passed per [facility] protocol. Warning for duplicate therapy (Dexilant) Routing to provider for consideration.    Requested Prescriptions   Pending Prescriptions Disp Refills    Omeprazole 40 MG Oral Capsule Delayed Release 30 capsule 1     Sig: Take 1 capsule (40 mg total) by mouth every morning before breakfast.        Gastrointestional Medication Protocol Passed - 4/7/2022  9:51 AM        Passed - Appointment in past 12 or next 3 months             Recent Outpatient Visits              4 months ago Gastroesophageal reflux disease, unspecified whether esophagitis present    [facility], [address] [facility]    Telemedicine    5 months ago Protein C deficiency [facility])    [facility], [address] [place] [address]    Telemedicine    5 months ago Protein C deficiency [facility])    [facility], [address] [facility]    Telemedicine    8 months ago Routine general medical examination at a health care facility    [facility], [address] [facility], [address]    10 months ago Bilateral lumbar radiculopathy    [facility] Luis Abebe DO    Office Visit           Future Appointments         Provider Department Appt Notes    In 1 month [address] [facility]

## 2022-04-11 RX ORDER — OMEPRAZOLE 40 MG/1
40 CAPSULE, DELAYED RELEASE ORAL
Qty: 90 CAPSULE | Refills: 1 | Status: SHIPPED | OUTPATIENT
Start: 2022-04-11

## 2022-04-29 ENCOUNTER — MED REC SCAN ONLY (OUTPATIENT)
Dept: DERMATOLOGY CLINIC | Facility: CLINIC | Age: 47
End: 2022-04-29

## 2022-05-18 ENCOUNTER — MED REC SCAN ONLY (OUTPATIENT)
Dept: FAMILY MEDICINE CLINIC | Facility: CLINIC | Age: 47
End: 2022-05-18

## 2022-05-24 ENCOUNTER — ANTI-COAG VISIT (OUTPATIENT)
Dept: ANTICOAGULATION | Facility: CLINIC | Age: 47
End: 2022-05-24
Payer: COMMERCIAL

## 2022-05-24 DIAGNOSIS — Z51.81 ENCOUNTER FOR THERAPEUTIC DRUG MONITORING: ICD-10-CM

## 2022-05-24 DIAGNOSIS — D68.9 COAGULATION DEFECT (HCC): ICD-10-CM

## 2022-05-24 DIAGNOSIS — Z79.01 LONG TERM (CURRENT) USE OF ANTICOAGULANTS: ICD-10-CM

## 2022-05-24 DIAGNOSIS — D68.59 PROTEIN C DEFICIENCY (HCC): ICD-10-CM

## 2022-05-24 LAB
INR: 2.8 (ref 0.8–1.2)
TEST STRIP EXPIRATION DATE: ABNORMAL DATE

## 2022-05-24 PROCEDURE — 93793 ANTICOAG MGMT PT WARFARIN: CPT | Performed by: FAMILY MEDICINE

## 2022-05-24 PROCEDURE — 85610 PROTHROMBIN TIME: CPT | Performed by: FAMILY MEDICINE

## 2022-07-19 ENCOUNTER — ANTI-COAG VISIT (OUTPATIENT)
Dept: ANTICOAGULATION | Facility: CLINIC | Age: 47
End: 2022-07-19
Payer: COMMERCIAL

## 2022-07-19 DIAGNOSIS — Z79.01 LONG TERM (CURRENT) USE OF ANTICOAGULANTS: ICD-10-CM

## 2022-07-19 DIAGNOSIS — D68.9 COAGULATION DEFECT (HCC): ICD-10-CM

## 2022-07-19 DIAGNOSIS — D68.59 PROTEIN C DEFICIENCY (HCC): ICD-10-CM

## 2022-07-19 DIAGNOSIS — Z51.81 ENCOUNTER FOR THERAPEUTIC DRUG MONITORING: ICD-10-CM

## 2022-07-19 LAB
INR: 2.1 (ref 0.8–1.2)
TEST STRIP EXPIRATION DATE: ABNORMAL DATE

## 2022-07-19 PROCEDURE — 93793 ANTICOAG MGMT PT WARFARIN: CPT | Performed by: INTERNAL MEDICINE

## 2022-07-19 PROCEDURE — 85610 PROTHROMBIN TIME: CPT | Performed by: INTERNAL MEDICINE

## 2022-07-28 ENCOUNTER — TELEPHONE (OUTPATIENT)
Dept: INTERNAL MEDICINE CLINIC | Facility: CLINIC | Age: 47
End: 2022-07-28

## 2022-07-28 NOTE — TELEPHONE ENCOUNTER
Patient indicates he picked up his script for rx omeprazole,[facility] but is only for 1 tablet a day. Patient indicates he takes 2 tablets a day. Patient will need another script to hold him over. *Also, patient requesting script for rx Warfarin to be sent to [facility], patient informed to call office to be put on automatic refill. Please update at [phone],[id number].

## 2022-07-28 NOTE — TELEPHONE ENCOUNTER
[facility] confirms patient's insurance will only cover Omeprazole 40mg once daily, please advise on new script.

## 2022-07-29 RX ORDER — OMEPRAZOLE 40 MG/1
40 CAPSULE, DELAYED RELEASE ORAL
Qty: 180 CAPSULE | Refills: 1 | Status: SHIPPED | OUTPATIENT
Start: 2022-07-29 | End: 2022-07-29

## 2022-07-29 RX ORDER — OMEPRAZOLE 40 MG/1
40 CAPSULE, DELAYED RELEASE ORAL
Qty: 180 CAPSULE | Refills: 1 | Status: SHIPPED | OUTPATIENT
Start: 2022-07-29

## 2022-08-02 ENCOUNTER — TELEPHONE (OUTPATIENT)
Dept: INTERNAL MEDICINE CLINIC | Facility: CLINIC | Age: 47
End: 2022-08-02

## 2022-08-02 RX ORDER — OMEPRAZOLE 40 MG/1
40 CAPSULE, DELAYED RELEASE ORAL
Qty: 180 CAPSULE | Refills: 1 | Status: SHIPPED | OUTPATIENT
Start: 2022-08-02

## 2022-08-02 NOTE — TELEPHONE ENCOUNTER
Patient called because he was prescribed omeprazole 40 mg BID and was instructed to use a good rx coupon since his insurance company doesn't pay for it.  He said he spoke to his insurance company and they suggested his doctors office to do a prior authorization

## 2022-08-02 NOTE — TELEPHONE ENCOUNTER
Spoke to Alvaro from rx benefits she said this is not rejecting at the pharmacy. Patient has to use local [employer] for a 90 day supply and it will be covered. I spoke to patient and informed him. He would like it sent to local [facility] on file. Refills were sent to local [id number], [place] - [address] [address] Encounter   Priority and Order Details      Disp Refills Start End    Omeprazole 40 MG Oral Capsule Delayed Release 180 capsule 1 7/29/2022     Sig - Route: Take 1 capsule (40 mg total) by mouth 2 (two) times daily before meals. - Oral    Sent to pharmacy as: Omeprazole 40 MG Oral Capsule Delayed Release    Notes to Pharmacy: Patient is using good rx coupon    E-Prescribing Status: Receipt confirmed by pharmacy (7/29/2022 12:10 PM CDT)    Prior authorization: Closed - Electronic Prior Authorization not supported. Submit via other methods.

## 2022-09-13 ENCOUNTER — ANTI-COAG VISIT (OUTPATIENT)
Dept: ANTICOAGULATION | Facility: CLINIC | Age: 47
End: 2022-09-13
Payer: COMMERCIAL

## 2022-09-13 DIAGNOSIS — D68.9 COAGULATION DEFECT (HCC): ICD-10-CM

## 2022-09-13 DIAGNOSIS — Z79.01 LONG TERM (CURRENT) USE OF ANTICOAGULANTS: ICD-10-CM

## 2022-09-13 DIAGNOSIS — Z51.81 ENCOUNTER FOR THERAPEUTIC DRUG MONITORING: ICD-10-CM

## 2022-09-13 DIAGNOSIS — D68.59 PROTEIN C DEFICIENCY (HCC): ICD-10-CM

## 2022-09-13 LAB
INR: 2.4 (ref 0.8–1.2)
TEST STRIP EXPIRATION DATE: ABNORMAL DATE

## 2022-09-13 PROCEDURE — 85610 PROTHROMBIN TIME: CPT | Performed by: INTERNAL MEDICINE

## 2022-09-13 PROCEDURE — 93793 ANTICOAG MGMT PT WARFARIN: CPT | Performed by: INTERNAL MEDICINE

## 2022-09-20 NOTE — Clinical Note
2 weeks video.  Thanks [employer]ise[employer] Pregnancy And Lactation Text: This medication is Pregnancy Category X and is known to cause serious birth defects. It is unknown if this medication is excreted in breast milk but breast feeding should be avoided.

## 2022-10-22 RX ORDER — OMEPRAZOLE 40 MG/1
40 CAPSULE, DELAYED RELEASE ORAL
Qty: 90 CAPSULE | Refills: 1 | Status: SHIPPED | OUTPATIENT
Start: 2022-10-22

## 2022-10-22 NOTE — TELEPHONE ENCOUNTER
Refill passed per [facility] protocol.     Requested Prescriptions   Pending Prescriptions Disp Refills    OMEPRAZOLE 40 MG Oral Capsule Delayed Release [Pharmacy Med Name: OMEPRAZOLE DR 40 MG CAPSULE] 90 capsule 1     Sig: TAKE 1 CAPSULE BY MOUTH EVERY DAY IN THE MORNING BEFORE BREAKFAST        Gastrointestional Medication Protocol Passed - 10/22/2022  7:15 AM        Passed - In person appointment or virtual visit in the past 12 mos or appointment in next 3 mos       Recent Outpatient Visits              10 months ago Gastroesophageal reflux disease, unspecified whether esophagitis present    [facility], [address] [facility],     Telemedicine    11 months ago Protein C deficiency [facility])    [facility], [address] [facility]    Telemedicine    1 year ago Protein C deficiency [facility])    [facility], [address] [facility]    Telemedicine    1 year ago Routine general medical examination at a health care facility    [facility], [address] [facility], [address] [place] [address]    1 year ago Bilateral lumbar radiculopathy    [facility] Lucy Caal DO    Office Visit     Future Appointments         Provider Department Appt Notes    In 2 weeks [address] [facility], [address] [place] [address] Visits              10 months ago Gastroesophageal reflux disease, unspecified whether esophagitis present    [facility], [address] [facility],     Telemedicine    11 months ago Protein C deficiency [facility])    [facility], [address] [facility],     Telemedicine    1 year ago Protein C deficiency [facility])    [facility], [address] [facility],     Telemedicine    1 year ago Routine general medical examination at a health care facility    [facility], [address] [place] [address], Naseem Rome,     Office Visit    1 year ago Bilateral lumbar radiculopathy    [facility] Jaja Ariza,     Office Visit            Future Appointments         Provider Department Appt Notes    In 2 weeks Maine Beatty RN [facility], [facility], [facility]

## 2022-11-08 ENCOUNTER — ANTI-COAG VISIT (OUTPATIENT)
Dept: ANTICOAGULATION | Facility: CLINIC | Age: 47
End: 2022-11-08
Payer: COMMERCIAL

## 2022-11-08 ENCOUNTER — TELEPHONE (OUTPATIENT)
Dept: ANTICOAGULATION | Facility: CLINIC | Age: 47
End: 2022-11-08

## 2022-11-08 DIAGNOSIS — Z79.01 MONITORING FOR LONG-TERM ANTICOAGULANT USE: ICD-10-CM

## 2022-11-08 DIAGNOSIS — Z51.81 MONITORING FOR LONG-TERM ANTICOAGULANT USE: Primary | ICD-10-CM

## 2022-11-08 DIAGNOSIS — Z51.81 MONITORING FOR LONG-TERM ANTICOAGULANT USE: ICD-10-CM

## 2022-11-08 DIAGNOSIS — Z79.01 MONITORING FOR LONG-TERM ANTICOAGULANT USE: Primary | ICD-10-CM

## 2022-11-08 DIAGNOSIS — D68.59 PROTEIN C DEFICIENCY (HCC): ICD-10-CM

## 2022-11-08 DIAGNOSIS — D68.59 PROTEIN C DEFICIENCY (HCC): Primary | ICD-10-CM

## 2022-11-08 LAB — INR: 1.9 (ref 0.8–1.2)

## 2022-11-08 PROCEDURE — 93793 ANTICOAG MGMT PT WARFARIN: CPT | Performed by: FAMILY MEDICINE

## 2022-11-08 PROCEDURE — 85610 PROTHROMBIN TIME: CPT | Performed by: FAMILY MEDICINE

## 2022-11-10 PROBLEM — Z79.01 MONITORING FOR LONG-TERM ANTICOAGULANT USE: Status: ACTIVE | Noted: 2022-11-10

## 2022-11-10 PROBLEM — Z51.81 MONITORING FOR LONG-TERM ANTICOAGULANT USE: Status: ACTIVE | Noted: 2022-11-10

## 2022-12-07 RX ORDER — OMEPRAZOLE 40 MG/1
40 CAPSULE, DELAYED RELEASE ORAL 2 TIMES DAILY
Qty: 180 CAPSULE | Refills: 0 | Status: SHIPPED | OUTPATIENT
Start: 2022-12-07

## 2022-12-07 NOTE — TELEPHONE ENCOUNTER
Patient states he picked up his omeprazole last month and it was one for daily but he had been taking it BID. Confirmed via history this is correct, patient would have had 1 refill on file, resent corrected instructions. Patient is asking to schedule physical, transferred to [facility] for further assistance.

## 2022-12-27 ENCOUNTER — OFFICE VISIT (OUTPATIENT)
Dept: FAMILY MEDICINE CLINIC | Facility: CLINIC | Age: 47
End: 2022-12-27
Payer: COMMERCIAL

## 2022-12-27 VITALS
TEMPERATURE: 98 F | BODY MASS INDEX: 24.28 KG/M2 | SYSTOLIC BLOOD PRESSURE: 107 MMHG | HEIGHT: 70 IN | DIASTOLIC BLOOD PRESSURE: 74 MMHG | WEIGHT: 169.63 LBS | OXYGEN SATURATION: 99 % | HEART RATE: 77 BPM

## 2022-12-27 DIAGNOSIS — K22.70 BARRETT'S ESOPHAGUS WITHOUT DYSPLASIA: ICD-10-CM

## 2022-12-27 DIAGNOSIS — Z79.01 LONG TERM (CURRENT) USE OF ANTICOAGULANTS: ICD-10-CM

## 2022-12-27 DIAGNOSIS — Z00.00 ROUTINE GENERAL MEDICAL EXAMINATION AT A HEALTH CARE FACILITY: Primary | ICD-10-CM

## 2022-12-27 DIAGNOSIS — K21.9 GASTROESOPHAGEAL REFLUX DISEASE, UNSPECIFIED WHETHER ESOPHAGITIS PRESENT: ICD-10-CM

## 2022-12-27 DIAGNOSIS — D68.9 COAGULATION DEFECT (HCC): ICD-10-CM

## 2022-12-27 DIAGNOSIS — S93.422A SPRAIN OF DELTOID LIGAMENT OF LEFT ANKLE, INITIAL ENCOUNTER: ICD-10-CM

## 2022-12-27 PROBLEM — M21.40 PES PLANUS: Status: ACTIVE | Noted: 2022-12-27

## 2022-12-27 PROCEDURE — 3074F SYST BP LT 130 MM HG: CPT | Performed by: FAMILY MEDICINE

## 2022-12-27 PROCEDURE — 99396 PREV VISIT EST AGE 40-64: CPT | Performed by: FAMILY MEDICINE

## 2022-12-27 PROCEDURE — 3008F BODY MASS INDEX DOCD: CPT | Performed by: FAMILY MEDICINE

## 2022-12-27 PROCEDURE — 3078F DIAST BP <80 MM HG: CPT | Performed by: FAMILY MEDICINE

## 2022-12-27 PROCEDURE — 99213 OFFICE O/P EST LOW 20 MIN: CPT | Performed by: FAMILY MEDICINE

## 2022-12-27 RX ORDER — WARFARIN SODIUM 5 MG/1
TABLET ORAL
Qty: 90 TABLET | Refills: 3 | Status: SHIPPED | OUTPATIENT
Start: 2022-12-27

## 2022-12-27 RX ORDER — OMEPRAZOLE 40 MG/1
40 CAPSULE, DELAYED RELEASE ORAL 2 TIMES DAILY
Qty: 180 CAPSULE | Refills: 3 | Status: SHIPPED | OUTPATIENT
Start: 2022-12-27

## 2022-12-27 RX ORDER — OMEPRAZOLE 40 MG/1
40 CAPSULE, DELAYED RELEASE ORAL 2 TIMES DAILY
Qty: 180 CAPSULE | Refills: 3 | Status: SHIPPED | OUTPATIENT
Start: 2022-12-27 | End: 2022-12-27

## 2022-12-28 ENCOUNTER — HOSPITAL ENCOUNTER (OUTPATIENT)
Dept: GENERAL RADIOLOGY | Facility: HOSPITAL | Age: 47
Discharge: HOME | End: 2022-12-28
Attending: FAMILY MEDICINE
Payer: COMMERCIAL

## 2022-12-28 ENCOUNTER — LAB ENCOUNTER (OUTPATIENT)
Dept: LAB | Facility: HOSPITAL | Age: 47
End: 2022-12-28
Attending: FAMILY MEDICINE
Payer: COMMERCIAL

## 2022-12-28 DIAGNOSIS — Z00.00 ROUTINE GENERAL MEDICAL EXAMINATION AT A HEALTH CARE FACILITY: ICD-10-CM

## 2022-12-28 DIAGNOSIS — S93.422A SPRAIN OF DELTOID LIGAMENT OF LEFT ANKLE, INITIAL ENCOUNTER: ICD-10-CM

## 2022-12-28 LAB
ALBUMIN SERPL-MCNC: 3.6 G/DL (ref 3.4–5)
ALBUMIN/GLOB SERPL: 1.1 {RATIO} (ref 1–2)
ALP LIVER SERPL-CCNC: 89 U/L
ALT SERPL-CCNC: 58 U/L
ANION GAP SERPL CALC-SCNC: 5 MMOL/L (ref 0–18)
AST SERPL-CCNC: 38 U/L (ref 15–37)
BASOPHILS # BLD AUTO: 0.03 X10(3) UL (ref 0–0.2)
BASOPHILS NFR BLD AUTO: 0.5 %
BILIRUB SERPL-MCNC: 0.5 MG/DL (ref 0.1–2)
BUN BLD-MCNC: 24 MG/DL (ref 7–18)
BUN/CREAT SERPL: 19 (ref 10–20)
CALCIUM BLD-MCNC: 9.6 MG/DL (ref 8.5–10.1)
CHLORIDE SERPL-SCNC: 105 MMOL/L (ref 98–112)
CHOLEST SERPL-MCNC: 312 MG/DL (ref ?–200)
CO2 SERPL-SCNC: 31 MMOL/L (ref 21–32)
COMPLEXED PSA SERPL-MCNC: 0.6 NG/ML (ref ?–4)
CREAT BLD-MCNC: 1.26 MG/DL
DEPRECATED RDW RBC AUTO: 41.5 FL (ref 35.1–46.3)
EOSINOPHIL # BLD AUTO: 0.12 X10(3) UL (ref 0–0.7)
EOSINOPHIL NFR BLD AUTO: 1.9 %
ERYTHROCYTE [DISTWIDTH] IN BLOOD BY AUTOMATED COUNT: 12.6 % (ref 11–15)
FASTING PATIENT LIPID ANSWER: YES
FASTING STATUS PATIENT QL REPORTED: YES
GFR SERPLBLD BASED ON 1.73 SQ M-ARVRAT: 70 ML/MIN/1.73M2 (ref 60–?)
GLOBULIN PLAS-MCNC: 3.4 G/DL (ref 2.8–4.4)
GLUCOSE BLD-MCNC: 101 MG/DL (ref 70–99)
HCT VFR BLD AUTO: 46.6 %
HDLC SERPL-MCNC: 43 MG/DL (ref 40–59)
HGB BLD-MCNC: 15.6 G/DL
IMM GRANULOCYTES # BLD AUTO: 0.02 X10(3) UL (ref 0–1)
IMM GRANULOCYTES NFR BLD: 0.3 %
LDLC SERPL CALC-MCNC: 180 MG/DL (ref ?–100)
LYMPHOCYTES # BLD AUTO: 3.98 X10(3) UL (ref 1–4)
LYMPHOCYTES NFR BLD AUTO: 62.4 %
MCH RBC QN AUTO: 29.8 PG (ref 26–34)
MCHC RBC AUTO-ENTMCNC: 33.5 G/DL (ref 31–37)
MCV RBC AUTO: 88.9 FL
MONOCYTES # BLD AUTO: 0.46 X10(3) UL (ref 0.1–1)
MONOCYTES NFR BLD AUTO: 7.2 %
NEUTROPHILS # BLD AUTO: 1.77 X10 (3) UL (ref 1.5–7.7)
NEUTROPHILS # BLD AUTO: 1.77 X10(3) UL (ref 1.5–7.7)
NEUTROPHILS NFR BLD AUTO: 27.7 %
NONHDLC SERPL-MCNC: 269 MG/DL (ref ?–130)
OSMOLALITY SERPL CALC.SUM OF ELEC: 296 MOSM/KG (ref 275–295)
PLATELET # BLD AUTO: 257 10(3)UL (ref 150–450)
POTASSIUM SERPL-SCNC: 4.4 MMOL/L (ref 3.5–5.1)
PROT SERPL-MCNC: 7 G/DL (ref 6.4–8.2)
RBC # BLD AUTO: 5.24 X10(6)UL
SODIUM SERPL-SCNC: 141 MMOL/L (ref 136–145)
TRIGL SERPL-MCNC: 442 MG/DL (ref 30–149)
VLDLC SERPL CALC-MCNC: 95 MG/DL (ref 0–30)
WBC # BLD AUTO: 6.4 X10(3) UL (ref 4–11)

## 2022-12-28 PROCEDURE — 73610 X-RAY EXAM OF ANKLE: CPT | Performed by: FAMILY MEDICINE

## 2022-12-28 PROCEDURE — 80053 COMPREHEN METABOLIC PANEL: CPT

## 2022-12-28 PROCEDURE — 80061 LIPID PANEL: CPT

## 2022-12-28 PROCEDURE — 36415 COLL VENOUS BLD VENIPUNCTURE: CPT

## 2022-12-28 PROCEDURE — 85025 COMPLETE CBC W/AUTO DIFF WBC: CPT

## 2023-01-03 ENCOUNTER — ANTI-COAG VISIT (OUTPATIENT)
Dept: ANTICOAGULATION | Facility: CLINIC | Age: 48
End: 2023-01-03
Payer: COMMERCIAL

## 2023-01-03 DIAGNOSIS — Z79.01 MONITORING FOR LONG-TERM ANTICOAGULANT USE: ICD-10-CM

## 2023-01-03 DIAGNOSIS — D68.59 PROTEIN C DEFICIENCY (HCC): ICD-10-CM

## 2023-01-03 DIAGNOSIS — Z51.81 MONITORING FOR LONG-TERM ANTICOAGULANT USE: ICD-10-CM

## 2023-01-03 LAB
INR: 3.1 (ref 0.8–1.2)
TEST STRIP EXPIRATION DATE: ABNORMAL DATE

## 2023-01-03 PROCEDURE — 85610 PROTHROMBIN TIME: CPT | Performed by: FAMILY MEDICINE

## 2023-01-03 PROCEDURE — 93793 ANTICOAG MGMT PT WARFARIN: CPT | Performed by: FAMILY MEDICINE

## 2023-01-03 NOTE — PROGRESS NOTES
Face to face. Reports that he took an extra dose of warfarin last night- he could not remember taking it and so he took an extra pill- states he is going to start using a pill box. Per protocol, patient to continue current dose and recheck INR in 4-6 weeks. Insists to return in 8 weeks.

## 2023-01-09 DIAGNOSIS — E78.00 HYPERCHOLESTEROLEMIA: Primary | ICD-10-CM

## 2023-02-28 ENCOUNTER — ANTI-COAG VISIT (OUTPATIENT)
Dept: ANTICOAGULATION | Facility: CLINIC | Age: 48
End: 2023-02-28

## 2023-02-28 DIAGNOSIS — D68.59 PROTEIN C DEFICIENCY (HCC): ICD-10-CM

## 2023-02-28 DIAGNOSIS — Z51.81 MONITORING FOR LONG-TERM ANTICOAGULANT USE: ICD-10-CM

## 2023-02-28 DIAGNOSIS — Z79.01 MONITORING FOR LONG-TERM ANTICOAGULANT USE: ICD-10-CM

## 2023-02-28 LAB
INR: 2.9 (ref 0.8–1.2)
TEST STRIP EXPIRATION DATE: ABNORMAL DATE

## 2023-02-28 PROCEDURE — 93793 ANTICOAG MGMT PT WARFARIN: CPT | Performed by: FAMILY MEDICINE

## 2023-02-28 PROCEDURE — 85610 PROTHROMBIN TIME: CPT | Performed by: FAMILY MEDICINE

## 2023-02-28 NOTE — PROGRESS NOTES
Face to face. Reports he started using a pill box and this has helped him keep better track of taking his meds. Per protocol, patient to continue current dose and recheck INR in 4-6 weeks. Insists to return in 8 weeks.

## 2023-03-22 ENCOUNTER — TELEPHONE (OUTPATIENT)
Dept: FAMILY MEDICINE CLINIC | Facility: CLINIC | Age: 48
End: 2023-03-22

## 2023-04-25 ENCOUNTER — ANTI-COAG VISIT (OUTPATIENT)
Dept: ANTICOAGULATION | Facility: CLINIC | Age: 48
End: 2023-04-25

## 2023-04-25 DIAGNOSIS — D68.59 PROTEIN C DEFICIENCY (HCC): ICD-10-CM

## 2023-04-25 DIAGNOSIS — Z51.81 MONITORING FOR LONG-TERM ANTICOAGULANT USE: ICD-10-CM

## 2023-04-25 DIAGNOSIS — Z79.01 MONITORING FOR LONG-TERM ANTICOAGULANT USE: ICD-10-CM

## 2023-04-25 LAB — INR: 2.4 (ref 2–3)

## 2023-04-25 PROCEDURE — 85610 PROTHROMBIN TIME: CPT | Performed by: FAMILY MEDICINE

## 2023-04-25 PROCEDURE — 93793 ANTICOAG MGMT PT WARFARIN: CPT | Performed by: FAMILY MEDICINE

## 2023-04-25 NOTE — PROGRESS NOTES
Patient in clinic for INR check. Denied changes with medications/diet. Confirmed daily dose with patient. INR within therapeutic range, advised per protocol, continue current dose and recheck INR 4-6 weeks, patient insists on returning in 8 weeks. Prior INR levels stable.      5 mg every Mon, Wed, Fri; 2.5 mg all other days

## 2023-05-19 ENCOUNTER — TELEPHONE (OUTPATIENT)
Dept: FAMILY MEDICINE CLINIC | Facility: CLINIC | Age: 48
End: 2023-05-19

## 2023-05-19 NOTE — TELEPHONE ENCOUNTER
Received fax request from Komal Lutz RN, [facility] from [facility] for last visit notes and results.  Faxed/confirmed A:[phone] f:[phone]

## 2023-06-20 ENCOUNTER — ANTI-COAG VISIT (OUTPATIENT)
Dept: ANTICOAGULATION | Facility: CLINIC | Age: 48
End: 2023-06-20

## 2023-06-20 DIAGNOSIS — D68.59 PROTEIN C DEFICIENCY (HCC): ICD-10-CM

## 2023-06-20 DIAGNOSIS — Z79.01 MONITORING FOR LONG-TERM ANTICOAGULANT USE: ICD-10-CM

## 2023-06-20 DIAGNOSIS — Z51.81 MONITORING FOR LONG-TERM ANTICOAGULANT USE: ICD-10-CM

## 2023-06-20 LAB
INR: 2.5 (ref 0.8–1.2)
TEST STRIP EXPIRATION DATE: ABNORMAL DATE

## 2023-06-20 PROCEDURE — 85610 PROTHROMBIN TIME: CPT | Performed by: FAMILY MEDICINE

## 2023-06-20 PROCEDURE — 93793 ANTICOAG MGMT PT WARFARIN: CPT | Performed by: FAMILY MEDICINE

## 2023-08-15 ENCOUNTER — ANTI-COAG VISIT (OUTPATIENT)
Dept: ANTICOAGULATION | Facility: CLINIC | Age: 48
End: 2023-08-15

## 2023-08-15 DIAGNOSIS — Z51.81 MONITORING FOR LONG-TERM ANTICOAGULANT USE: ICD-10-CM

## 2023-08-15 DIAGNOSIS — Z79.01 MONITORING FOR LONG-TERM ANTICOAGULANT USE: ICD-10-CM

## 2023-08-15 DIAGNOSIS — D68.59 PROTEIN C DEFICIENCY (HCC): Primary | ICD-10-CM

## 2023-08-15 LAB
INR: 3.1 (ref 0.8–1.2)
TEST STRIP EXPIRATION DATE: ABNORMAL DATE

## 2023-08-15 PROCEDURE — 93793 ANTICOAG MGMT PT WARFARIN: CPT | Performed by: FAMILY MEDICINE

## 2023-08-15 PROCEDURE — 85610 PROTHROMBIN TIME: CPT | Performed by: FAMILY MEDICINE

## 2023-08-15 NOTE — PROGRESS NOTES
Face to face. Reports that he has been eating less salads- he will add in some greens/veggies to his diet. INR is minimally above therapeutic goal range. Per protocol, continue current dose and recheck INR in 4-6 weeks. Declines and states he prefers to return in 8 weeks.     5 mg every Mon, Wed, Fri; 2.5 mg all other days

## 2023-10-10 ENCOUNTER — ANTI-COAG VISIT (OUTPATIENT)
Dept: ANTICOAGULATION | Facility: CLINIC | Age: 48
End: 2023-10-10

## 2023-10-10 ENCOUNTER — TELEPHONE (OUTPATIENT)
Dept: ANTICOAGULATION | Facility: CLINIC | Age: 48
End: 2023-10-10

## 2023-10-10 DIAGNOSIS — Z79.01 MONITORING FOR LONG-TERM ANTICOAGULANT USE: ICD-10-CM

## 2023-10-10 DIAGNOSIS — D68.59 PROTEIN C DEFICIENCY (HCC): Primary | ICD-10-CM

## 2023-10-10 DIAGNOSIS — Z51.81 MONITORING FOR LONG-TERM ANTICOAGULANT USE: ICD-10-CM

## 2023-10-10 LAB
INR: 2.9 (ref 0.8–1.2)
TEST STRIP EXPIRATION DATE: ABNORMAL DATE

## 2023-10-10 PROCEDURE — 93793 ANTICOAG MGMT PT WARFARIN: CPT | Performed by: FAMILY MEDICINE

## 2023-10-10 PROCEDURE — 85610 PROTHROMBIN TIME: CPT | Performed by: FAMILY MEDICINE

## 2023-10-10 NOTE — PROGRESS NOTES
Face to face. Per protocol, continue current dose and recheck INR in 4-6 weeks. Declines and states he will return in 8 weeks.     5 mg every Mon, Wed, Fri; 2.5 mg all other days

## 2023-10-10 NOTE — TELEPHONE ENCOUNTER
Anticoag referral expires soon. Order pended. Please sign, thank you.       Dx: Protein [facility] deficiency ([facility] (D68.59)  Monitoring for long-term anticoagulant use (Z51.81,Z79.01)

## 2023-10-12 DIAGNOSIS — D68.59 PROTEIN C DEFICIENCY (HCC): Primary | ICD-10-CM

## 2023-10-12 DIAGNOSIS — Z79.01 LONG TERM (CURRENT) USE OF ANTICOAGULANTS: ICD-10-CM

## 2023-10-13 ENCOUNTER — TELEPHONE (OUTPATIENT)
Dept: ANTICOAGULATION | Facility: CLINIC | Age: 48
End: 2023-10-13

## 2023-10-13 NOTE — TELEPHONE ENCOUNTER
Patient has an appointment for INR @ [facility] clinic 12/05/23    Notes 10/10/23: Per protocol, continue current dose and recheck INR in 4-6 weeks. Declines and states he will return in 8 weeks.

## 2023-10-13 NOTE — TELEPHONE ENCOUNTER
----- Message from Dean Hargrove DO sent at 10/12/2023 12:46 AM CDT -----  Regarding: INR Due 10/10/23  Contact: [phone]  Leeann Coto is due for an INR test on 10/10/23.

## 2023-11-10 ENCOUNTER — LAB ENCOUNTER (OUTPATIENT)
Dept: LAB | Age: 48
End: 2023-11-10
Attending: NURSE PRACTITIONER
Payer: COMMERCIAL

## 2023-11-10 ENCOUNTER — OFFICE VISIT (OUTPATIENT)
Dept: INTERNAL MEDICINE CLINIC | Facility: CLINIC | Age: 48
End: 2023-11-10

## 2023-11-10 ENCOUNTER — NURSE TRIAGE (OUTPATIENT)
Dept: FAMILY MEDICINE CLINIC | Facility: CLINIC | Age: 48
End: 2023-11-10

## 2023-11-10 VITALS
DIASTOLIC BLOOD PRESSURE: 93 MMHG | HEART RATE: 90 BPM | WEIGHT: 164 LBS | BODY MASS INDEX: 23.48 KG/M2 | HEIGHT: 70 IN | SYSTOLIC BLOOD PRESSURE: 130 MMHG

## 2023-11-10 DIAGNOSIS — R19.7 DIARRHEA OF PRESUMED INFECTIOUS ORIGIN: ICD-10-CM

## 2023-11-10 DIAGNOSIS — R19.7 DIARRHEA OF PRESUMED INFECTIOUS ORIGIN: Primary | ICD-10-CM

## 2023-11-10 LAB
DEPRECATED RDW RBC AUTO: 39.6 FL (ref 35.1–46.3)
ERYTHROCYTE [DISTWIDTH] IN BLOOD BY AUTOMATED COUNT: 12.5 % (ref 11–15)
HCT VFR BLD AUTO: 47 %
HGB BLD-MCNC: 16 G/DL
MCH RBC QN AUTO: 29.5 PG (ref 26–34)
MCHC RBC AUTO-ENTMCNC: 34 G/DL (ref 31–37)
MCV RBC AUTO: 86.7 FL
PLATELET # BLD AUTO: 213 10(3)UL (ref 150–450)
RBC # BLD AUTO: 5.42 X10(6)UL
WBC # BLD AUTO: 8.3 X10(3) UL (ref 4–11)

## 2023-11-10 PROCEDURE — 87077 CULTURE AEROBIC IDENTIFY: CPT

## 2023-11-10 PROCEDURE — 99203 OFFICE O/P NEW LOW 30 MIN: CPT | Performed by: NURSE PRACTITIONER

## 2023-11-10 PROCEDURE — 3008F BODY MASS INDEX DOCD: CPT | Performed by: NURSE PRACTITIONER

## 2023-11-10 PROCEDURE — 87046 STOOL CULTR AEROBIC BACT EA: CPT

## 2023-11-10 PROCEDURE — 85027 COMPLETE CBC AUTOMATED: CPT

## 2023-11-10 PROCEDURE — 87427 SHIGA-LIKE TOXIN AG IA: CPT

## 2023-11-10 PROCEDURE — 3080F DIAST BP >= 90 MM HG: CPT | Performed by: NURSE PRACTITIONER

## 2023-11-10 PROCEDURE — 3075F SYST BP GE 130 - 139MM HG: CPT | Performed by: NURSE PRACTITIONER

## 2023-11-10 PROCEDURE — 80053 COMPREHEN METABOLIC PANEL: CPT

## 2023-11-10 PROCEDURE — 87045 FECES CULTURE AEROBIC BACT: CPT

## 2023-11-10 PROCEDURE — 87493 C DIFF AMPLIFIED PROBE: CPT

## 2023-11-10 PROCEDURE — 36415 COLL VENOUS BLD VENIPUNCTURE: CPT

## 2023-11-10 NOTE — TELEPHONE ENCOUNTER
Action Requested: Summary for Provider     []  Critical Lab, Recommendations Needed  [] Need Additional Advice  []   FYI    []   Need Orders  [] Need Medications Sent to Pharmacy  []  Other     SUMMARY: Patient reports diarrhea since 11/6/2023. Reason for call: Diarrhea  Onset: 11/6/2023    Patient states he has had diarrhea since 11/6/2023. States he has taken Imodium and trying to stay hydrated but is not getting any relief. Patient denies fever, blood in stool, abdominal pain, vomiting. Patient schedule with an available provider for an evaluation of symptoms.     Future Appointments   Date Time Provider Ellis Fox   11/10/2023  1:20 PM NIKUNJ Owusu [facility]   12/5/2023  4:15 PM Bryce Christianson RN [address]         Reason for Disposition   MILD diarrhea (e.g., 1-3 or more stools than normal in past 24 hours) diarrhea without known cause and present > 7 days    Protocols used: Diarrhea-A-[facility]

## 2023-11-11 LAB
ALBUMIN SERPL-MCNC: 4.6 G/DL (ref 3.2–4.8)
ALBUMIN/GLOB SERPL: 1.6 {RATIO} (ref 1–2)
ALP LIVER SERPL-CCNC: 97 U/L
ALT SERPL-CCNC: 98 U/L
ANION GAP SERPL CALC-SCNC: 9 MMOL/L (ref 0–18)
AST SERPL-CCNC: 70 U/L (ref ?–34)
BILIRUB SERPL-MCNC: 0.9 MG/DL (ref 0.3–1.2)
BUN BLD-MCNC: 14 MG/DL (ref 9–23)
BUN/CREAT SERPL: 11.2 (ref 10–20)
C DIFF TOX B STL QL: NEGATIVE
CALCIUM BLD-MCNC: 9.5 MG/DL (ref 8.7–10.4)
CHLORIDE SERPL-SCNC: 105 MMOL/L (ref 98–112)
CO2 SERPL-SCNC: 24 MMOL/L (ref 21–32)
CREAT BLD-MCNC: 1.25 MG/DL
EGFRCR SERPLBLD CKD-EPI 2021: 70 ML/MIN/1.73M2 (ref 60–?)
FASTING STATUS PATIENT QL REPORTED: YES
GLOBULIN PLAS-MCNC: 2.9 G/DL (ref 2.8–4.4)
GLUCOSE BLD-MCNC: 115 MG/DL (ref 70–99)
OSMOLALITY SERPL CALC.SUM OF ELEC: 287 MOSM/KG (ref 275–295)
POTASSIUM SERPL-SCNC: 4 MMOL/L (ref 3.5–5.1)
PROT SERPL-MCNC: 7.5 G/DL (ref 5.7–8.2)
SODIUM SERPL-SCNC: 138 MMOL/L (ref 136–145)

## 2023-11-13 ENCOUNTER — NURSE TRIAGE (OUTPATIENT)
Dept: INTERNAL MEDICINE CLINIC | Facility: CLINIC | Age: 48
End: 2023-11-13

## 2023-11-13 NOTE — TELEPHONE ENCOUNTER
Action Requested: Summary for Provider     []  Critical Lab, Recommendations Needed  [] Need Additional Advice  [x]   FYI    []   Need Orders  [] Need Medications Sent to Pharmacy  []  Other     SUMMARY: Informed of NIKUNJ's treatment plan, per Stool Culture Results. Agreeable to plan. Start Antibiotic. Recheck INR day after antibiotic course completed. Recheck liver Panel in 1 month. Reason for call: Condition Update      Patient called office. Date of birth and full name both confirmed. Asking about test results and what it means. Diarrhea as improved. RN informed of provider's message as detailed . All questions/concerns addressed. Encouraged hydration  He verbalizes understanding of all information, and agreeable to plan. Will have INR checked after completing antibiotic course. Liver enzymes - He was using Nyquil and Dayquil , but not using at this time. Advised cautions with Acetaminophen, advised not to use NSAIDs, since previously, Dr. Gina Segal had advised no NSAIDs since he is on coumadin. Denies additional questions. Will call back if other issues/questions. Advised to monitor symptoms. RN advised if symptoms get severely worse, patient should seek care at Emergency Room or Immediate Care. RN also informed patient to seek immediate medical attention at ER if patient experiences severe/worsening symptoms, shortness of breath, chest pain, or severe pain. Patient verbalizes understanding and is agreeable to instructions. Result Care Coordination      Result Notes     NIKUNJ Nur  11/12/2023  9:56 PM CST Back to Top      Stool positive for Campylobacter jejuni  This can come from consuming raw poultry. Azithromycin sent to pharmacy. Liver enzymes are elevated. Repeat blood work in one month. He will need his INR checked after completing antibiotic therapy.      Reason for Disposition   Recent medical visit within 24 hours and symptoms BETTER (improving) and caller has additional questions triager can answer    Protocols used: Recent Medical Visit for Illness Follow-up Call-[facility]

## 2023-11-14 ENCOUNTER — TELEPHONE (OUTPATIENT)
Dept: INTERNAL MEDICINE CLINIC | Facility: CLINIC | Age: 48
End: 2023-11-14

## 2023-11-14 RX ORDER — CIPROFLOXACIN 750 MG/1
750 TABLET, FILM COATED ORAL 2 TIMES DAILY
Qty: 6 TABLET | Refills: 0 | Status: SHIPPED | OUTPATIENT
Start: 2023-11-14 | End: 2023-11-17

## 2023-11-14 NOTE — TELEPHONE ENCOUNTER
Patient calling to report he started the Azithromycin 500mg on 11/13/2023 and has had a bad headache since. Patient states the headache kept him up at night. He has taken his migraine medication and staying hydrated but the headache has not subsided. Patient is concerned with taking antibiotic for today, and is inquiring if there is anything else he can take instead. Please advise.

## 2023-11-15 ENCOUNTER — ANTI-COAG VISIT (OUTPATIENT)
Dept: ANTICOAGULATION | Facility: CLINIC | Age: 48
End: 2023-11-15

## 2023-11-15 DIAGNOSIS — D68.59 PROTEIN C DEFICIENCY (HCC): Primary | ICD-10-CM

## 2023-11-15 DIAGNOSIS — Z79.01 MONITORING FOR LONG-TERM ANTICOAGULANT USE: ICD-10-CM

## 2023-11-15 DIAGNOSIS — Z51.81 MONITORING FOR LONG-TERM ANTICOAGULANT USE: ICD-10-CM

## 2023-11-15 NOTE — TELEPHONE ENCOUNTER
Antibiotic changed to ciprofloxacin 750mg BID x 3 days. He should discontinue azithromycin. He needs to have his INR checked after completing his antibiotic therapy. Ciprofloxacin can increase his INR level.

## 2023-11-15 NOTE — PROGRESS NOTES
Antibiotic changed to ciprofloxacin 750mg BID x 3 days. He should discontinue azithromycin. He needs to have his INR checked after completing his antibiotic therapy. Ciprofloxacin can increase his INR level. Note   Pt notified by RN.

## 2023-11-17 NOTE — TELEPHONE ENCOUNTER
Go ahead and start abx. If he should inform coumadin clinic next INR check. Spoke to patient. She was advice that PCP was unable to write a letter stating she can only work 8 hour days. She was also advised PCP would write a letter stating she needs more frequent breaks. Patient was very upset. She said \"all she is asking for is to work the 8 hours she was hired on for\". She states 10 hours she is being mandated is too difficult with her Diabetes and she is struggling to keep her sugars under control. She is worried she may pass out at work . She also states if she eats she needs to immediately use the rest room and that is not possible at work. She states \"  PCP does not care about her. She is her PCP and she is supposed to care about her health and make sure she is ok\". She was offered an other appointment to speak to PCP. She said it doesnt pay. She has spoken to her 3 times about this issue and she refuses. She states she will have to find care elsewhere..

## 2023-11-30 ENCOUNTER — TELEMEDICINE (OUTPATIENT)
Dept: INTERNAL MEDICINE CLINIC | Facility: CLINIC | Age: 48
End: 2023-11-30
Payer: COMMERCIAL

## 2023-11-30 ENCOUNTER — NURSE TRIAGE (OUTPATIENT)
Dept: FAMILY MEDICINE CLINIC | Facility: CLINIC | Age: 48
End: 2023-11-30

## 2023-11-30 DIAGNOSIS — U07.1 COVID-19: Primary | ICD-10-CM

## 2023-11-30 LAB — AMB EXT COVID-19 RESULT: DETECTED

## 2023-11-30 PROCEDURE — 99213 OFFICE O/P EST LOW 20 MIN: CPT | Performed by: INTERNAL MEDICINE

## 2023-11-30 NOTE — PROGRESS NOTES
Patient ID: Deangelo Tom is a 46year old male. Chief Complaint   Patient presents with    Covid          HISTORY OF PRESENT ILLNESS:   Patient presents for above. This visit is conducted using Telemedicine with live, interactive video and audio. Diagnosed with COVID today after feeling tired and having headaches. No fevers. He is on warfarin for previous pulmonary embolism. Feels better today. Review of Systems   Constitutional:  Positive for fatigue. HENT: Negative. Eyes: Negative. Respiratory: Negative. Cardiovascular: Negative. Gastrointestinal: Negative. Endocrine: Negative. Genitourinary: Negative. Musculoskeletal: Negative. Skin: Negative. Allergic/Immunologic: Negative. Neurological:  Positive for headaches. Hematological: Negative. Psychiatric/Behavioral: Negative. MEDICAL HISTORY:     Past Medical History:   Diagnosis Date    DVT (deep venous thrombosis) (HCC)     per  coumadin    Esophageal reflux     Protein C deficiency (HCC)     per  & lupus antocoag. def    Pulmonary embolism ([facility] [address]     per  coumadin    Syncope 2014       Past Surgical History:   Procedure Laterality Date    OTHER SURGICAL HISTORY      corizone injection on neck for migraines 5/2019         Current Outpatient Medications:     azithromycin 500 MG Oral Tab, Take 1 tablet (500 mg total) by mouth daily. Take 1 tablet daily for 3 days. , Disp: 3 tablet, Rfl: 0    warfarin 5 MG Oral Tab, 5mg M W F and 2.5mg all other days, Disp: 90 tablet, Rfl: 3    Omeprazole 40 MG Oral Capsule Delayed Release, Take 1 capsule (40 mg total) by mouth in the morning and 1 capsule (40 mg total) before bedtime. , Disp: 180 capsule, Rfl: 3    EMGALITY 120 MG/ML Subcutaneous Solution Prefilled Syringe, Inject 120 mg into the skin every 30 (thirty) days. , Disp: 1 Syringe, Rfl: 11    ubrogepant (UBRELVY) 50 MG Oral Tab, Take 50 mg by mouth daily as needed (headache). , Disp: 10 tablet, Rfl: 11    Allergies: Allergies   Allergen Reactions    Shrimp SWELLING       Social History     Socioeconomic History    Marital status:      Spouse name: Not on file    Number of children: Not on file    Years of education: Not on file    Highest education level: Not on file   Occupational History    Not on file   Tobacco Use    Smoking status: Never    Smokeless tobacco: Never   Vaping Use    Vaping Use: Never used   Substance and Sexual Activity    Alcohol use: No    Drug use: No    Sexual activity: Not on file   Other Topics Concern     Service Not Asked    Blood Transfusions Not Asked    Caffeine Concern Yes     Comment: 1cup coffee and soda    Occupational Exposure Not Asked    Hobby Hazards Not Asked    Sleep Concern Not Asked    Stress Concern Not Asked    Weight Concern Not Asked    Special Diet Not Asked    Back Care Not Asked    Exercise No    Bike Helmet Not Asked    Seat Belt Not Asked    Self-Exams Not Asked   Social History Narrative    The patient does not use an assistive device. .      The patient does live in a home with stairs. Social Determinants of Health     Financial Resource Strain: Not on file   Food Insecurity: Not on file   Transportation Needs: Not on file   Physical Activity: Not on file   Stress: Not on file   Social Connections: Not on file   Housing Stability: Not on file       PHYSICAL EXAM:   Unable to perform vitals or do physical exam as this is a virtual video visit. Patient appears alert. No conversational dyspnea or distress. ASSESSMENT/PLAN:   1. COVID-19  After discussion regarding side effects of Paxlovid and potential interaction with warfarin it was decided not to start medication. Symptomatic treatment discussed. Quarantine guidelines discussed. Return if symptoms worsen or fail to improve.     Time spent on encounter  11 minutes   Video time 6 minutes   Documentation time 5 minutes     Sharif Haney understands video evaluation is not a substitute for face-to-face examination or emergency care. Patient advised to go to ER or call 911 for worsening symptoms or acute distress. Telehealth outside of [address] Verbal Consent   I conducted a telehealth visit with Paty Hawk today, 11/30/23, which was completed using two-way, real-time interactive audio and video communication. This has been done in good vivek to provide continuity of care in the best interest of the provider-patient relationship, due to the COVID -19 public health crisis/national emergency where restrictions of face-to-face office visits are ongoing. Every conscious effort was taken to allow for sufficient and adequate time to complete the visit. The patient was made aware of the limitations of the telehealth visit, including treatment limitations as no physical exam could be performed. The patient was advised to call 911 or to go to the ER in case there was an emergency. The patient was also advised of the potential privacy & security concerns related to the telehealth platform. The patient was made aware of where to find [facility] notice of privacy practices, telehealth consent form and other related consent forms and documents. which are located on the [facility] website. The patient verbally agreed to telehealth consent form, related consents and the risks discussed. Lastly, the patient confirmed that they were in [place]. Included in this visit, time may have been spent reviewing labs, medications, radiology tests and decision making. Appropriate medical decision-making and tests are ordered as detailed in the plan of care above. Coding/billing information is submitted for this visit based on complexity of care and/or time spent for the visit. This note was prepared using [employer] voice recognition dictation software. As a result errors may occur. When identified these errors have been corrected.  While every attempt is made to correct errors during dictation discrepancies may still exist.    Brad Wilson MD  11/30/2023

## 2023-11-30 NOTE — TELEPHONE ENCOUNTER
Pt stated that he took a covid test and it was positive and then he took another one and it was negative so he is doing a 3rd one but he strongly feels he has covid and will like to know if he can be given paxlovid. Pt stated that he feels really tired,headache is able to taste and smell. Pt was inform of CDC guidelines on quarantine for 5 days and then he can go out after the 5 days as long as he has no fever and his s/sx are improving. Also informed he needs to go to the ER if he starts to feel lethargic  , chest pain or pressure or sob. Pt inform to push fluids and rest. Pt was given a video visit to discuss Paxlovid.     Future Appointments   Date Time Provider Ellis Fox   11/30/2023  4:15 PM Marcel Hall MD [facility] [place]   12/5/2023  4:15 PM Justina Jacobson RN [id number]  [facility]       Reason for Disposition   [1] [id number] diagnosed by positive lab test (e.g., PCR, rapid self-test kit) AND [2] mild symptoms (e.g., cough, fever, others) AND [6] no complications or SOB    Protocols used: Coronavirus (COVID-19) Diagnosed or [id number]

## 2023-12-01 ENCOUNTER — TELEPHONE (OUTPATIENT)
Dept: FAMILY MEDICINE CLINIC | Facility: CLINIC | Age: 48
End: 2023-12-01

## 2023-12-15 ENCOUNTER — ANTI-COAG VISIT (OUTPATIENT)
Dept: ANTICOAGULATION | Facility: CLINIC | Age: 48
End: 2023-12-15

## 2023-12-15 DIAGNOSIS — Z79.01 MONITORING FOR LONG-TERM ANTICOAGULANT USE: ICD-10-CM

## 2023-12-15 DIAGNOSIS — D68.59 PROTEIN C DEFICIENCY (HCC): Primary | ICD-10-CM

## 2023-12-15 DIAGNOSIS — Z51.81 MONITORING FOR LONG-TERM ANTICOAGULANT USE: ICD-10-CM

## 2023-12-15 LAB — INR: 4.4 (ref 0.8–1.2)

## 2023-12-15 PROCEDURE — 93793 ANTICOAG MGMT PT WARFARIN: CPT | Performed by: FAMILY MEDICINE

## 2023-12-15 PROCEDURE — 85610 PROTHROMBIN TIME: CPT | Performed by: FAMILY MEDICINE

## 2023-12-15 NOTE — PROGRESS NOTES
Face to face. Over the past few weeks, states he has had a GI flu and COVID. This past Tuesday 12/12, he received steroid injections in his feet. Plan for three additional steroid injections in January. Educated that elevated INR is most likely due to recent steroid injections. Per protocol, hold 2 doses of warfarin, then resume current dose and recheck INR in 1-2 weeks. Patient declines and will recheck INR after steroid injections in 6 weeks.     12/15: Hold; 12/16: Hold; Otherwise 5 mg every Mon, Wed, Fri; 2.5 mg all other days

## 2024-02-02 ENCOUNTER — ANTI-COAG VISIT (OUTPATIENT)
Dept: ANTICOAGULATION | Facility: CLINIC | Age: 49
End: 2024-02-02

## 2024-02-02 DIAGNOSIS — Z51.81 MONITORING FOR LONG-TERM ANTICOAGULANT USE: ICD-10-CM

## 2024-02-02 DIAGNOSIS — D68.59 PROTEIN C DEFICIENCY (HCC): Primary | ICD-10-CM

## 2024-02-02 DIAGNOSIS — Z79.01 MONITORING FOR LONG-TERM ANTICOAGULANT USE: ICD-10-CM

## 2024-02-02 LAB — INR: 2.6 (ref 0.8–1.2)

## 2024-02-02 PROCEDURE — 93793 ANTICOAG MGMT PT WARFARIN: CPT | Performed by: FAMILY MEDICINE

## 2024-02-02 NOTE — PROGRESS NOTES
Face to face.     Reports he received steroid injections in his foot during the month of January- with the last injection, he held warfarin for 2 nights post procedure then resumed usual dose. INR today is therapeutic.     Per protocol, continue current dose and recheck INR in 4-6 weeks. Declines and states he will return in 8 weeks.    5 mg every Mon, Wed, Fri; 2.5 mg all other days        [Alert] : alert [Oriented x 3] : ~L oriented x 3 [Well Nourished] : well nourished [Conjunctiva Non-injected] : conjunctiva non-injected [No Visual Lymphadenopathy] : no visual  lymphadenopathy [No Clubbing] : no clubbing [No Edema] : no edema [No Bromhidrosis] : no bromhidrosis [No Chromhidrosis] : no chromhidrosis [Full Body Skin Exam Performed] : performed [FreeTextEntry3] : Genital exam not performed

## 2024-03-29 ENCOUNTER — ANTI-COAG VISIT (OUTPATIENT)
Dept: ANTICOAGULATION | Facility: CLINIC | Age: 49
End: 2024-03-29

## 2024-03-29 DIAGNOSIS — Z79.01 LONG TERM (CURRENT) USE OF ANTICOAGULANTS: ICD-10-CM

## 2024-03-29 DIAGNOSIS — Z79.01 MONITORING FOR LONG-TERM ANTICOAGULANT USE: ICD-10-CM

## 2024-03-29 DIAGNOSIS — Z51.81 MONITORING FOR LONG-TERM ANTICOAGULANT USE: ICD-10-CM

## 2024-03-29 DIAGNOSIS — D68.59 PROTEIN C DEFICIENCY (HCC): Primary | ICD-10-CM

## 2024-03-29 LAB
INR: 3.3 (ref 2–3)
TEST STRIP EXPIRATION DATE: ABNORMAL DATE

## 2024-03-29 PROCEDURE — 93793 ANTICOAG MGMT PT WARFARIN: CPT | Performed by: FAMILY MEDICINE

## 2024-03-29 PROCEDURE — 85610 PROTHROMBIN TIME: CPT | Performed by: FAMILY MEDICINE

## 2024-03-29 NOTE — PROGRESS NOTES
Face-to-Face  / INR 3.3, supra therapeutic. (Goal 2.5 ) TTR 75.9 %     Etiology: mostly stable. He isn't eating many vegetables. 3/29/2024: WARFARIN: Beyond the Basics w extras mailed. UptoDate.    PLAN: stay on the current dose but start eating more green vegetables.    Recheck INR 3 weeks.    Pt reports:    No sign of unusual bruising or bleeding.  Any missed doses: No   Medications changes: No    Contacted  Krishan by phone  who verbalized understanding and agreement.    WARFARIN Plan per protocol: 5 mg every Mon, Wed, Fri; 2.5 mg all other days

## 2024-04-04 RX ORDER — OMEPRAZOLE 40 MG/1
40 CAPSULE, DELAYED RELEASE ORAL 2 TIMES DAILY
Qty: 180 CAPSULE | Refills: 3 | Status: SHIPPED | OUTPATIENT
Start: 2024-04-04

## 2024-04-04 NOTE — TELEPHONE ENCOUNTER
Refill passed per [facility] protocol.    LOV 11/10/2023 with NIKUNJ Hurt       Requested Prescriptions   Pending Prescriptions Disp Refills    OMEPRAZOLE 40 MG Oral Capsule Delayed Release [Pharmacy Med Name: OMEPRAZOLE DR 40 MG CAPSULE] 180 capsule 3     Sig: Take 1 capsule (40 mg total) by mouth in the morning and 1 capsule (40 mg total) before bedtime.       Gastrointestional Medication Protocol Failed - 4/3/2024 12:51 AM        Failed - In person appointment or virtual visit in the past 12 mos or appointment in next 3 mos     Recent Out[facility]its              [facility] [place], Garfield Alarcon MD    Telemedicine    4 months ago Diarrhea of presumed infectious origin    [facility] [place]    Office Visit    1 year ago Routine general medical examination at a health care facility    [facility] [place], [place]    Office Visit    2 years ago Gastroesophageal reflux disease, unspecified whether esophagitis present    [facility] [place] Velaz[place]    Telemedicine    2 years ago Protein C deficiency (HCC)    [facility] [place] Vel[place]    Telemedicine          Future Appointments         Provider Department Appt Notes    In 1 week Sherry Boggs RN [facility]                   Future Appointments         Provider Department Appt Notes    In 1 week [facility]           Recent Outpatient Visits              4 months ago [facility]orencia    [facility] [place] [place]    Telemedicine    4 months ago Diarrhea of presumed infectious origin    [facility]  Nikita, Aubree Ray APRN    Office Visit    1 year ago Routine general medical examination at a health care facility    [place] [facility], Eliu Fonseca DO    Office Visit    2 years ago Gastroesophageal reflux disease, unspecified whether esophagitis present    [place] [facility], Eliu Fonseca DO    Telemedicine    2 years ago Protein C deficiency (HCC)    [place] [facility] Janelle Shelton Matthew, DO    Telemedicine

## 2024-04-17 ENCOUNTER — ANTI-COAG VISIT (OUTPATIENT)
Dept: ANTICOAGULATION | Facility: CLINIC | Age: 49
End: 2024-04-17

## 2024-04-17 DIAGNOSIS — Z79.01 MONITORING FOR LONG-TERM ANTICOAGULANT USE: ICD-10-CM

## 2024-04-17 DIAGNOSIS — D68.59 PROTEIN C DEFICIENCY (HCC): Primary | ICD-10-CM

## 2024-04-17 DIAGNOSIS — Z51.81 MONITORING FOR LONG-TERM ANTICOAGULANT USE: ICD-10-CM

## 2024-04-17 LAB
INR: 2.6 (ref 2–3)
TEST STRIP EXPIRATION DATE: ABNORMAL DATE

## 2024-04-17 PROCEDURE — 85610 PROTHROMBIN TIME: CPT | Performed by: FAMILY MEDICINE

## 2024-04-17 PROCEDURE — 93793 ANTICOAG MGMT PT WARFARIN: CPT | Performed by: FAMILY MEDICINE

## 2024-04-17 NOTE — PROGRESS NOTES
Face-to-Face  / INR 2.6,  therapeutic. (Goal 2.5 ) TTR 75.5 %     Etiology: Krishan started eating vegetables. No changes and no questions. 8 weeks is okay.    PLAN: continue the current dose.    Recheck INR 4 weeks is suggested, Pt has been checking every 8 weeks. INR is stable. Check sooner if any medication changes.    Pt reports:    No sign of unusual bruising or bleeding.  Any missed doses: No   Medications changes: No    Contacted  Krishan verbalized understanding and agreement.    WARFARIN Plan per protocol: 5 mg every Mon, Wed, Fri; 2.5 mg all other days

## 2024-04-19 RX ORDER — WARFARIN SODIUM 5 MG/1
TABLET ORAL
Qty: 90 TABLET | Refills: 1 | Status: SHIPPED | OUTPATIENT
Start: 2024-04-19

## 2024-04-19 NOTE — TELEPHONE ENCOUNTER
Passed WARFARIN refill protocol.    WARFARIN Plan per protocol: 5 mg every Mon, Wed, Fri; 2.5 mg all other days

## 2024-05-16 ENCOUNTER — OFFICE VISIT (OUTPATIENT)
Dept: FAMILY MEDICINE CLINIC | Facility: CLINIC | Age: 49
End: 2024-05-16

## 2024-05-16 VITALS
DIASTOLIC BLOOD PRESSURE: 84 MMHG | OXYGEN SATURATION: 99 % | HEIGHT: 70 IN | WEIGHT: 170 LBS | HEART RATE: 71 BPM | SYSTOLIC BLOOD PRESSURE: 112 MMHG | BODY MASS INDEX: 24.34 KG/M2

## 2024-05-16 DIAGNOSIS — K22.70 BARRETT'S ESOPHAGUS WITHOUT DYSPLASIA: ICD-10-CM

## 2024-05-16 DIAGNOSIS — Z79.01 LONG TERM (CURRENT) USE OF ANTICOAGULANTS: ICD-10-CM

## 2024-05-16 DIAGNOSIS — Z00.00 ROUTINE GENERAL MEDICAL EXAMINATION AT A HEALTH CARE FACILITY: Primary | ICD-10-CM

## 2024-05-16 DIAGNOSIS — D68.9 COAGULATION DEFECT (HCC): ICD-10-CM

## 2024-05-16 PROCEDURE — 3008F BODY MASS INDEX DOCD: CPT | Performed by: FAMILY MEDICINE

## 2024-05-16 PROCEDURE — 3074F SYST BP LT 130 MM HG: CPT | Performed by: FAMILY MEDICINE

## 2024-05-16 PROCEDURE — 99396 PREV VISIT EST AGE 40-64: CPT | Performed by: FAMILY MEDICINE

## 2024-05-16 PROCEDURE — 3079F DIAST BP 80-89 MM HG: CPT | Performed by: FAMILY MEDICINE

## 2024-05-16 RX ORDER — WARFARIN SODIUM 5 MG/1
TABLET ORAL
Qty: 90 TABLET | Refills: 3 | Status: SHIPPED | OUTPATIENT
Start: 2024-05-16

## 2024-05-16 NOTE — PROGRESS NOTES
Subjective:   Krishan Cabello is a 49 year old male who presents for Physical (Annual Physical ) and Medication Request (Medication Refill )       History/Other:    Chief Complaint Reviewed and Verified  Nursing Notes Reviewed and   Verified  Tobacco Reviewed  Allergies Reviewed  Medications Reviewed    Problem List Reviewed  Medical History Reviewed  Surgical History   Reviewed  Family History Reviewed  Social History Reviewed         Tobacco:  He has never smoked tobacco.    Current Outpatient Medications   Medication Sig Dispense Refill    warfarin 5 MG Oral Tab Take as directed by INR clinic or take 1 tablet Monday Wednesday Friday, take 1/2 tablet all other days. 90 tablet 3    Omeprazole 40 MG Oral Capsule Delayed Release Take 1 capsule (40 mg total) by mouth in the morning and 1 capsule (40 mg total) before bedtime. 180 capsule 3    EMGALITY 120 MG/ML Subcutaneous Solution Prefilled Syringe Inject 120 mg into the skin every 30 (thirty) days. 1 Syringe 11    ubrogepant (UBRELVY) 50 MG Oral Tab Take 50 mg by mouth daily as needed (headache). 10 tablet 11         Review of Systems:  Review of Systems   Constitutional: Negative.    HENT: Negative.     Eyes: Negative.    Respiratory: Negative.     Cardiovascular: Negative.    Gastrointestinal: Negative.    Endocrine: Negative for polydipsia, polyphagia and polyuria.   Genitourinary: Negative.    Musculoskeletal: Negative.    Skin: Negative.         No mole changes   Allergic/Immunologic: Negative for environmental allergies.   Neurological:  Negative for dizziness, weakness, numbness and headaches.   Hematological: Negative.    Psychiatric/Behavioral:  Negative for sleep disturbance.         No anxiety or depressed feelings         Objective:   /84 (BP Location: Right arm, Patient Position: Sitting, Cuff Size: adult)   Pulse 71   Ht 5' 10\" (1.778 m)   Wt 170 lb (77.1 kg)   SpO2 99%   BMI 24.39 kg/m²  Estimated body mass index is 24.39 kg/m²  as calculated from the following:    Height as of this encounter: 5' 10\" (1.778 m).    Weight as of this encounter: 170 lb (77.1 kg).  Physical Exam  Constitutional:       Appearance: Normal appearance. He is well-developed.   HENT:      Head: Normocephalic.      Right Ear: Tympanic membrane, ear canal and external ear normal.      Left Ear: Tympanic membrane, ear canal and external ear normal.      Nose: Nose normal.   Eyes:      General: Lids are normal.      Conjunctiva/sclera: Conjunctivae normal.      Pupils: Pupils are equal, round, and reactive to light.      Funduscopic exam:     Right eye: No hemorrhage or papilledema.         Left eye: No hemorrhage or papilledema.   Neck:      Vascular: Normal carotid pulses. No JVD.      Trachea: Trachea normal.   Cardiovascular:      Rate and Rhythm: Regular rhythm.      Pulses:           Carotid pulses are 2+ on the right side and 2+ on the left side.       Radial pulses are 2+ on the right side and 2+ on the left side.      Heart sounds: Normal heart sounds.   Pulmonary:      Breath sounds: Normal breath sounds.   Abdominal:      Tenderness: There is no abdominal tenderness.   Musculoskeletal:      Cervical back: Normal, normal range of motion and neck supple.      Thoracic back: Normal.      Lumbar back: Normal.   Lymphadenopathy:      Cervical: No cervical adenopathy.   Skin:     Comments: No suspicious lesions waist up exam   Neurological:      General: No focal deficit present.      Mental Status: He is alert and oriented to person, place, and time.      Sensory: No sensory deficit.      Deep Tendon Reflexes: Reflexes are normal and symmetric.   Psychiatric:         Mood and Affect: Mood normal. Mood is not anxious or depressed.           Assessment & Plan:   1. Routine general medical examination at a health care facility (Primary)  -     CBC With Differential With Platelet; Future; Expected date: 05/16/2024  -     Comp Metabolic Panel (14); Future; Expected  date: 05/16/2024  -     Lipid Panel; Future; Expected date: 05/16/2024  -     PSA Total, Screen; Future; Expected date: 05/16/2024  -     Urinalysis, Routine; Future; Expected date: 05/16/2024  2. Coagulation defect (HCC)  3. Long term (current) use of anticoagulants  4. Santo's esophagus without dysplasia  Other orders  -     Warfarin Sodium; Take as directed by INR clinic or take 1 tablet Monday Wednesday Friday, take 1/2 tablet all other days.  Dispense: 90 tablet; Refill: 3  Normal exam. Labs ordered. He will continue his warfarin and testing through the anticoagulation clinic.       No follow-ups on file.    Eliu García DO, 5/16/2024, 11:41 AM

## 2024-06-12 ENCOUNTER — ANTI-COAG VISIT (OUTPATIENT)
Dept: ANTICOAGULATION | Facility: CLINIC | Age: 49
End: 2024-06-12

## 2024-06-12 DIAGNOSIS — Z51.81 MONITORING FOR LONG-TERM ANTICOAGULANT USE: ICD-10-CM

## 2024-06-12 DIAGNOSIS — Z79.01 LONG TERM (CURRENT) USE OF ANTICOAGULANTS: ICD-10-CM

## 2024-06-12 DIAGNOSIS — Z79.01 MONITORING FOR LONG-TERM ANTICOAGULANT USE: ICD-10-CM

## 2024-06-12 DIAGNOSIS — D68.59 PROTEIN C DEFICIENCY (HCC): Primary | ICD-10-CM

## 2024-06-12 LAB
INR: 2.4 (ref 2–3)
TEST STRIP EXPIRATION DATE: NORMAL DATE

## 2024-06-12 PROCEDURE — 93793 ANTICOAG MGMT PT WARFARIN: CPT | Performed by: FAMILY MEDICINE

## 2024-06-12 PROCEDURE — 85610 PROTHROMBIN TIME: CPT | Performed by: FAMILY MEDICINE

## 2024-06-12 NOTE — PROGRESS NOTES
Face-to-Face  / INR 2.4,  therapeutic. (Goal 2.5 ) TTR 76.9 %     Etiology: stable. No changes.    PLAN: continue the current dose.    Recheck INR 8 weeks.    Pt reports:    No sign of unusual bruising or bleeding.  Any missed doses: No   Medications changes: No    Contacted  Krishan  verbalized understanding and agreement.    WARFARIN Plan per protocol: 5 mg every Mon, Wed, Fri; 2.5 mg all other days

## 2024-08-07 ENCOUNTER — ANTI-COAG VISIT (OUTPATIENT)
Dept: ANTICOAGULATION | Facility: CLINIC | Age: 49
End: 2024-08-07

## 2024-08-07 DIAGNOSIS — Z79.01 MONITORING FOR LONG-TERM ANTICOAGULANT USE: ICD-10-CM

## 2024-08-07 DIAGNOSIS — D68.59 PROTEIN C DEFICIENCY (HCC): Primary | ICD-10-CM

## 2024-08-07 DIAGNOSIS — Z51.81 MONITORING FOR LONG-TERM ANTICOAGULANT USE: ICD-10-CM

## 2024-08-07 LAB
INR: 2.8 (ref 0.8–1.2)
TEST STRIP EXPIRATION DATE: ABNORMAL DATE

## 2024-08-07 PROCEDURE — 93793 ANTICOAG MGMT PT WARFARIN: CPT | Performed by: FAMILY MEDICINE

## 2024-08-07 PROCEDURE — 85610 PROTHROMBIN TIME: CPT | Performed by: FAMILY MEDICINE

## 2024-08-07 NOTE — PROGRESS NOTES
Occupational Therapy    Visit Type: initial evaluation  SUBJECTIVE  Patient agreed to participate in therapy this date.  RN in agreement to work with patient for therapy session.  Patient states his breathing is good but coughing continues. Patient reports having memory issues from previous strokes.    Patient / Family Goal: maximize function    Pain   Patient reports pain is not an issue/concern.    OBJECTIVE     Cognitive Status   Arousal Alertness   - appropriate responses to stimuli  Affect/Behavior    - calm and cooperative (anxious at times)  Orientation    - Oriented to: person, place, time and situation  Functional Communication   - Overall Status: within functional limits  Attention Span    - Attention: intact  Following Direction   - follows all commands and directions consistently    Observation   Patient received sitting in chair.    Range of Motion (ROM)   (degrees unless noted; active unless noted; norms in ( ); negative=lacking to 0, positive=beyond 0)  WFL: LUE, RUE  WFL: LLE, RLE, except as noted  Comments: Limited right lower extremity movement due to patient report due to scrap metal from [place].    Strength  (out of 5 unless noted, standard test position unless noted)   WFL: LUE, RUE      Sitting Balance  (SLOANE = base of support)  Static      - Trial 1 details: independent    Standing Balance  (SLOANE = base of support)  Firm Surface: Double Leg      - Static, Eyes Open       - Trial 1 details: contact guard and with double UE support       Transfers  Assistive devices: gait belt, 2-wheeled walker  - Sit to stand: contact guard/touching/steadying assist  - Stand to sit: contact guard/touching/steadying assist      Activities of Daily Living (ADLs)  Grooming/Oral Hygiene:   - Grooming assist: set up  - Position: chair  Upper Body Dressing:  - Assist: set up and stand by assist  - Position: chair  Lower Body Dressing:   - Assist: set up, stand by assist and minimal assist  - Position: chair and  TTR 78.2%    Face to face.      Per protocol, continue current dose and recheck INR in 4-6 weeks. Declines and states he will return in 8 weeks.    5 mg every Mon, Wed, Fri; 2.5 mg all other days     standing  - Footwear:       - Assistance: minimal assist       - Position: chair  Lower body dressing equipment: Instructed pateint on use of adaptive equipment for lower body dressing.  Toileting:   - Toilet transfer:        - Assist: contact guard/touching/steadying assist       - Device: gait belt and 2-wheeled walker       - Equipment: toilet safety frame  - Assist: stand by assist  Bathing:   - Assist: set up, stand by assist and minimal assist (simulated)  - Position: sitting at sink and standing at sink  Interventions    Training provided: activity tolerance, ADL training, balance retraining, compensatory techniques, safety training, transfer training, use of adaptive equipment, energy conservation and body mechanics  Skilled input: verbal instruction/cues and tactile instruction/cues  Verbal Consent: Writer verbally educated and received verbal consent for hand placement, positioning of patient, and techniques to be performed today from patient for clothing adjustments for techniques as described above and how they are pertinent to the patient's plan of care.         Education:   - Present and ready to learn: patient  Education provided during session:  - Results of above outlined education: Verbalizes understanding    ASSESSMENT   Patient will benefit from inpatient skilled therapy to address current assessed functional limitations and impairments.  Interferring components: decreased activity tolerance    Discharge needs based on today's assessment:  - Current level of function: slightly below baseline level of function  - Therapy needs at discharge: therapy 1-3 times per week  - Activities of daily living (ADLs) requiring support at discharge: transfers, ambulation, dressing, bathing and toileting  - Instrumental activities of daily living (IADLs) requiring support at discharge: community mobility and home management  - Impairments that require further therapy intervention: strength, activity tolerance,  balance, safety awareness and ROM    AM-PAC Cognition Screen:  Cognition Score: 12/24  Cognition Interpretation: current level of function appear at baseline      AM-PAC  - Prior Level of Function: IND/MOD I ([facility] 22-24)       Key: MOD A=moderate assistance, IND/MOD I=independent/modified independent  - Generalized Current Level of Function     - Current Self-Cares: 21       Scoring Key= >21 Modified Independent; 20-21 Supervision; 18-19 Minimal assist; 13-17 Moderate assist; 9-12 Max assist; <9 Total assist       • Personal Occupations Profile Affected: bathing/showering, functional mobility/transfers, toileting/toilet hygiene, lower body dressing, community mobility, home establishment/managements     • Clinical decision making: Low - Patient has few limitations (1-3), comorbidities and/or complexities, as noted in problem focused assessment noted above, that impact their occupational profile.  Resulting in few treatment options and no task modification consistent with low clinical decision making complexity.    PLAN (while hospitalized)  Suggestions for next session as indicated:   OT Frequency: 3-5 x per week      PT/OT Mobility Equipment for Discharge: owns RW  Interventions: ADL retraining, functional transfer training, upper extremity strengthening/ROM, activity tolerance training, patient/family training, compensatory techniques, balance, safety training, transfer training and use of adaptive equipment  Agreement to plan and goals: patient agrees with goals and treatment plan      GOALS  Long Term Goals: (to be met by time of discharge from hospital)  Grooming: Patient will complete grooming tasks in sitting, in standing and at sink modified independent.  Upper body dressing: Patient will complete upper body dressing modified independent.  Lower body dressing: Patient will complete lower body dressing modified independent.  Toilet transfer: Patient will complete toilet transfer with modified independent.          Documented in the chart in the following areas: Assessment/Plan.    Patient at End of Session:   Location: in chair  Safety measures: alarm system in place/re-engaged, call light within reach, equipment intact and lines intact  Handoff to: nurse      Therapy procedure time and total treatment time can be found documented on the Time Entry flowsheet

## 2024-10-02 ENCOUNTER — ANTI-COAG VISIT (OUTPATIENT)
Dept: ANTICOAGULATION | Facility: CLINIC | Age: 49
End: 2024-10-02
Payer: COMMERCIAL

## 2024-10-02 DIAGNOSIS — Z79.01 MONITORING FOR LONG-TERM ANTICOAGULANT USE: ICD-10-CM

## 2024-10-02 DIAGNOSIS — D68.59 PROTEIN C DEFICIENCY (HCC): Primary | ICD-10-CM

## 2024-10-02 DIAGNOSIS — Z51.81 MONITORING FOR LONG-TERM ANTICOAGULANT USE: ICD-10-CM

## 2024-10-02 LAB
INR: 2.7 (ref 0.8–1.2)
TEST STRIP EXPIRATION DATE: ABNORMAL DATE
TEST STRIP LOT #: ABNORMAL NUMERIC

## 2024-10-02 PROCEDURE — 85610 PROTHROMBIN TIME: CPT | Performed by: FAMILY MEDICINE

## 2024-10-02 PROCEDURE — 93793 ANTICOAG MGMT PT WARFARIN: CPT | Performed by: FAMILY MEDICINE

## 2024-10-02 NOTE — PROGRESS NOTES
TTR 79.3%     Face to face.      Per protocol, continue current dose and recheck INR in 4-6 weeks. Declines and states he will return in 8 weeks.    5 mg every Mon, Wed, Fri; 2.5 mg all other days

## 2024-10-28 ENCOUNTER — TELEPHONE (OUTPATIENT)
Dept: ANTICOAGULATION | Facility: CLINIC | Age: 49
End: 2024-10-28

## 2024-10-28 DIAGNOSIS — D68.59 PROTEIN C DEFICIENCY (HCC): Primary | ICD-10-CM

## 2024-10-28 DIAGNOSIS — Z79.01 MONITORING FOR LONG-TERM ANTICOAGULANT USE: ICD-10-CM

## 2024-10-28 DIAGNOSIS — Z51.81 MONITORING FOR LONG-TERM ANTICOAGULANT USE: ICD-10-CM

## 2024-10-28 NOTE — TELEPHONE ENCOUNTER
Anticoag referral expires soon. Order pended. Please sign, thank you.      Dx: Protein C deficiency (HCC) (D68.59)  Monitoring for long-term anticoagulant use (Z51.81,Z79.01

## 2024-11-06 NOTE — TELEPHONE ENCOUNTER
Patient is requesting  a prescription refill and  mentions after today he does not have any more    Warfarin    [facility] in [facility].    Please advise.        2 seconds or less

## 2024-12-04 ENCOUNTER — ANTI-COAG VISIT (OUTPATIENT)
Dept: ANTICOAGULATION | Facility: CLINIC | Age: 49
End: 2024-12-04

## 2024-12-04 DIAGNOSIS — Z51.81 MONITORING FOR LONG-TERM ANTICOAGULANT USE: ICD-10-CM

## 2024-12-04 DIAGNOSIS — D68.59 PROTEIN C DEFICIENCY (HCC): Primary | ICD-10-CM

## 2024-12-04 DIAGNOSIS — Z79.01 MONITORING FOR LONG-TERM ANTICOAGULANT USE: ICD-10-CM

## 2024-12-04 LAB
INR: 3.1 (ref 2–3)
TEST STRIP EXPIRATION DATE: ABNORMAL DATE

## 2024-12-04 PROCEDURE — 85610 PROTHROMBIN TIME: CPT | Performed by: FAMILY MEDICINE

## 2024-12-04 PROCEDURE — 93793 ANTICOAG MGMT PT WARFARIN: CPT | Performed by: FAMILY MEDICINE

## 2024-12-04 NOTE — PROGRESS NOTES
Face-to-Face  / INR 3.1, supra therapeutic. (Goal 2.5 ) TTR 79.1 %     Etiology: INR is very stable. He did have flu sx over Thanksgiving, but is feeling better. I advised ~ if ANY bruising he needs to have an earlier INR check. Also he'll have an UGI procedure in Jan or Feb which will need to be bridged.     PLAN: continue the current dose.     Recheck INR 8 weeks. Sooner if changes.    Pt reports:    No sign of unusual bruising or bleeding.  Any missed doses: No   Medications changes: No    Contacted  Krishan verbalized understanding and agreement.    WARFARIN Plan per protocol: 5 mg every Mon, Wed, Fri; 2.5 mg all other days

## 2024-12-15 ENCOUNTER — HOSPITAL ENCOUNTER (EMERGENCY)
Facility: HOSPITAL | Age: 49
Discharge: LEFT BEFORE DISCHARGE | End: 2024-12-15
Payer: COMMERCIAL

## 2024-12-15 VITALS
TEMPERATURE: 97 F | RESPIRATION RATE: 18 BRPM | HEIGHT: 70 IN | WEIGHT: 173 LBS | DIASTOLIC BLOOD PRESSURE: 91 MMHG | SYSTOLIC BLOOD PRESSURE: 147 MMHG | HEART RATE: 111 BPM | BODY MASS INDEX: 24.77 KG/M2 | OXYGEN SATURATION: 99 %

## 2024-12-15 PROCEDURE — 93005 ELECTROCARDIOGRAM TRACING: CPT

## 2024-12-15 PROCEDURE — 99281 EMR DPT VST MAYX REQ PHY/QHP: CPT

## 2024-12-16 LAB
ATRIAL RATE: 98 BPM
P AXIS: 74 DEGREES
P-R INTERVAL: 136 MS
Q-T INTERVAL: 350 MS
QRS DURATION: 92 MS
QTC CALCULATION (BEZET): 446 MS
R AXIS: 72 DEGREES
T AXIS: 40 DEGREES
VENTRICULAR RATE: 98 BPM

## 2024-12-16 NOTE — ED INITIAL ASSESSMENT (HPI)
Pt arrives ambulatory to ED for c/o tachycardia. Pt denies CP/palpitations. Pt states his watch told him he has a RHR of 122. Pt's HR 105bpm-115bpm in triage. Aox4, speaking In full sentences.

## 2025-02-05 ENCOUNTER — ANTI-COAG VISIT (OUTPATIENT)
Dept: ANTICOAGULATION | Facility: CLINIC | Age: 50
End: 2025-02-05

## 2025-02-05 DIAGNOSIS — Z51.81 MONITORING FOR LONG-TERM ANTICOAGULANT USE: ICD-10-CM

## 2025-02-05 DIAGNOSIS — D68.59 PROTEIN C DEFICIENCY (HCC): Primary | ICD-10-CM

## 2025-02-05 DIAGNOSIS — Z79.01 MONITORING FOR LONG-TERM ANTICOAGULANT USE: ICD-10-CM

## 2025-02-05 LAB
INR: 3.3 (ref 2–3)
TEST STRIP EXPIRATION DATE: ABNORMAL DATE

## 2025-02-05 PROCEDURE — 85610 PROTHROMBIN TIME: CPT | Performed by: FAMILY MEDICINE

## 2025-02-05 PROCEDURE — 93793 ANTICOAG MGMT PT WARFARIN: CPT | Performed by: FAMILY MEDICINE

## 2025-02-05 NOTE — PROGRESS NOTES
Face-to-Face  / INR 3.3, supra therapeutic. (Goal 2.5 ) TTR 74.9 %     Etiology: slightly supra therapeutic. He denies any med changes. He did have 24 of diarrhea last week which has resolved.     PLAN: no changes. Call if diarrhea >24 hours or any unusual bleeding or bruising.     Recheck INR 8 weeks is okay for now. Sooner if changes. 8 weeks between testing will not be okay if/when there are health changes or advanced age.    Pt reports:    No sign of unusual bruising or bleeding.  Any missed doses: No   Medications changes: No  Diet changes:  No    Contacted  Krishan who verbalized understanding and agreement. ~ he agreed to contact if any signs on increasing INR.    WARFARIN Plan per protocol: 5 mg every Mon, Wed, Fri; 2.5 mg all other days

## 2025-03-18 ENCOUNTER — APPOINTMENT (OUTPATIENT)
Dept: GENERAL RADIOLOGY | Age: 50
End: 2025-03-18
Payer: COMMERCIAL

## 2025-03-18 ENCOUNTER — HOSPITAL ENCOUNTER (OUTPATIENT)
Age: 50
Discharge: HOME | End: 2025-03-18
Payer: COMMERCIAL

## 2025-03-18 VITALS
HEART RATE: 82 BPM | TEMPERATURE: 98 F | OXYGEN SATURATION: 100 % | RESPIRATION RATE: 20 BRPM | DIASTOLIC BLOOD PRESSURE: 78 MMHG | SYSTOLIC BLOOD PRESSURE: 151 MMHG

## 2025-03-18 DIAGNOSIS — L03.116 CELLULITIS OF LEFT LOWER EXTREMITY: Primary | ICD-10-CM

## 2025-03-18 DIAGNOSIS — M25.473 PAIN AND SWELLING OF ANKLE: ICD-10-CM

## 2025-03-18 DIAGNOSIS — M25.579 PAIN AND SWELLING OF ANKLE: ICD-10-CM

## 2025-03-18 PROCEDURE — 73610 X-RAY EXAM OF ANKLE: CPT

## 2025-03-18 PROCEDURE — 99204 OFFICE O/P NEW MOD 45 MIN: CPT

## 2025-03-18 RX ORDER — CEFADROXIL 500 MG/1
500 CAPSULE ORAL 2 TIMES DAILY
Qty: 14 CAPSULE | Refills: 0 | Status: SHIPPED | OUTPATIENT
Start: 2025-03-18 | End: 2025-03-25

## 2025-03-18 RX ORDER — SACCHAROMYCES BOULARDII 250 MG
250 CAPSULE ORAL 2 TIMES DAILY
Qty: 60 CAPSULE | Refills: 0 | Status: SHIPPED | OUTPATIENT
Start: 2025-03-18 | End: 2025-04-17

## 2025-03-18 NOTE — ED INITIAL ASSESSMENT (HPI)
Patient is here with sudden onset of pain and swelling in his left ankle.  He denies any injury.  He was sitting at work all day.

## 2025-03-19 ENCOUNTER — TELEPHONE (OUTPATIENT)
Dept: FAMILY MEDICINE CLINIC | Facility: CLINIC | Age: 50
End: 2025-03-19

## 2025-03-19 NOTE — TELEPHONE ENCOUNTER
Patient was seen in urgent care yesterday, had ankle xray.  Patient was prescribed antibiotics for possible cellulitis.  Patient states it is also possible that he has gout.  Patient states he was advised to contact Dr. García's office for blood test.  Patient states pain and swelling still present. Patient has to use crutches.  Scheduled video appointment for tomorrow.      Future Appointments   Date Time Provider Department Center   3/20/2025 10:20 AM Chana López APRN [facility]

## 2025-03-19 NOTE — ED PROVIDER NOTES
Patient Seen in: [facility] [place]      History   No chief complaint on file.    Stated Complaint: ankle pain  Subjective:   Krishan is a 50-year-old male presenting to the immediate care complaining of left ankle pain.  Patient states that he was at work today when the ankle pain started.  States he was sitting at his desk.  He walks to work every day at least a mile.  States that he walks a little bit quicker than normal today.  States that he had continued pain in the rest of the afternoon but then had to walk home, upstairs to the train and then to his vehicle which was at least another mile.  States that he has had continued swelling and pain since then so he came in for evaluation.  There was no fall, injury or trauma.  Denies any weakness, numbness, tingling.  Patient has not had a fever or any other systemic complaints.  He is otherwise feeling well.  He denies any other concerns or complaints.        Objective:   Past Medical History:    DVT (deep venous thrombosis) (HCC)    per ng coumadin    Esophageal reflux    Protein C deficiency (HCC)    per ng & lupus antocoag. def    Pulmonary embolism (HCC)    per ng coumadin    Syncope            Past Surgical History:   Procedure Laterality Date    Other surgical history      corizone injection on neck for migraines 5/2019              Social History     Socioeconomic History    Marital status:    Tobacco Use    Smoking status: Never    Smokeless tobacco: Never   Vaping Use    Vaping status: Never Used   Substance and Sexual Activity    Alcohol use: No    Drug use: No   Other Topics Concern    Caffeine Concern Yes     Comment: 1cup coffee and soda    Exercise No   Social History Narrative    The patient does not use an assistive device..      The patient does live in a home with stairs.     Social Drivers of Health      [facility]    Housing Stability            Review of  Systems    Positive for stated complaint: No chief complaint on file.    Other systems are as noted in HPI.  Constitutional and vital signs reviewed.      All other systems reviewed and negative except as noted above.    Physical Exam     ED Triage Vitals [03/18/25 1809]   /78   Pulse 82   Resp 20   Temp 98.2 °F (36.8 °C)   Temp src Oral   SpO2 100 %   O2 Device None (Room air)     Current:/78   Pulse 82   Temp 98.2 °F (36.8 °C) (Oral)   Resp 20   SpO2 100%     Physical Exam  Vitals and nursing note reviewed.   Constitutional:       General: He is not in acute distress.     Appearance: Normal appearance. He is not ill-appearing, toxic-appearing or diaphoretic.   HENT:      Head: Normocephalic.   Cardiovascular:      Rate and Rhythm: Normal rate.   Pulmonary:      Effort: Pulmonary effort is normal.   Musculoskeletal:         General: Normal range of motion.      Cervical back: Normal range of motion.      Right ankle: Normal.      Left ankle: Swelling present. No deformity, ecchymosis or lacerations. Tenderness present over the lateral malleolus. Normal range of motion. Normal pulse.      Left Achilles Tendon: Normal.      Right foot: Normal.      Left foot: Normal.      Comments: Positive CMS.  2+ DP and PT pulses. Capillary refill is less than 2 seconds.  Patient does have full range of motion to the entire left ankle, foot, and all 5 toes.  The left lower extremity, knee, thigh, and hip are unremarkable.  There are no abrasions or lacerations noted.  Patient does have some dry skin to his foot and ankle.  No ecchymosis noted.  Patient does have tenderness to lateral malleolus and entire area surrounding the lateral malleolus and swelling to the entire ankle.  There is erythema and warmth noted to the entire ankle.  No obvious deformity noted.  There is no calf pain, erythema, swelling or warmth.     Skin:     General: Skin is warm and dry.      Capillary Refill: Capillary refill takes less than 2  seconds.   Neurological:      General: No focal deficit present.      Mental Status: He is alert and oriented to person, place, and time.   Psychiatric:         Mood and Affect: Mood normal.         Behavior: Behavior normal.         Thought Content: Thought content normal.         Judgment: Judgment normal.         ED Course   Radiology:    XR ANKLE (MIN 3 VIEWS), LEFT (CPT=73610)   Final Result               =====   PROCEDURE: XR ANKLE (MIN 3 VIEWS), LEFT (CPT=73610)       COMPARISON: [facility], XR ANKLE (MIN 3 VIEWS),    LEFT (CPT=73610), 12/28/2022, 7:13 AM.       INDICATIONS: Acute pain/swelling over the left lateral mallelous.  No    known trauma.       TECHNIQUE: 3. views were obtained.         FINDINGS/conclusions:       No acute fracture or dislocation.       Ankle mortise is maintained.       No radiopaque foreign body.       No ankle effusion is seen.       Mild soft tissue swelling over the lateral malleolus, nonspecific.     Correlate with the clinical assessment to evaluate for soft tissue    infection        Dictated by (CST): Cleo Hernández MD on 3/18/2025 at 6:38 PM        Finalized by (CST): Cleo Hernández MD on 3/18/2025 at 6:40 PM                 Labs Reviewed - No data to display    MDM     Medical Decision Making  Differential diagnoses reflecting the complexity of care include but are not limited to gout, cellulitis, tendinitis, ankle sprain, ankle fracture.    Comorbidities that add complexity to management include: On warfarin, PE, DVT, devious tendon injury  History obtained by an independent source was from: Patient  My independent interpretations of studies include: X-ray  Shared decision making was done by: Patient, wife and myself  Patient is well appearing, non-toxic and in no acute distress.  Vital signs are stable.     50-year-old male complaining of left ankle pain, swelling and redness.  Discussed with the patient and his wife that this could be  cellulitis, gout or tendinitis.  Patient has no known history of gout.  There are no open wounds but patient does have dry skin on his feet.  There is tenderness to the entire ankle mostly surrounding the left lateral malleolus.    X-ray was normal.  Because of the risk of severe illness if cellulitis goes untreated, I sent a prescription for cefadroxil to treat potential cellulitis.  I did discuss with the patient that this may or may not be cellulitis however if it is and it goes untreated he could become very ill/septic.  Patient agrees to this treatment.  Also sent a prescription for Florastor.  Also discussed with the patient that this might be gout or tendinitis.  I will reach out to patient's primary care doctor to see if he can order an outpatient uric acid level.  Patient wishes to have this done and will to follow-up with primary care doctor later this week.  I did recommend NSAIDs however the patient states that he does not tolerate NSAIDs very well and gets an upset stomach.  I recommended elevation and Tylenol.  Considered steroids, however patient does not tolerate NSAIDs well and he is on Coumadin which the combination of steroids and Coumadin can affect his INR.  Recommended that if the patient develop any acutely worsening pain, fever, numbness, tingling or any other worsening or concerning complaints that he needs to go to the emergency department.  Otherwise recommended following up with primary care provider.    ED precautions discussed.  Patient (guardian) advised to follow up with PCP in 2-3 days.  Patient (guardian) agrees with this plan of care.  Patient (guardian) verbalizes understanding of discharge instructions and plan of care.      Amount and/or Complexity of Data Reviewed  Radiology: ordered and independent interpretation performed. Decision-making details documented in ED Course.    Risk  OTC drugs.  Prescription drug management.        Disposition and Plan     Clinical  Impression:  1. Cellulitis of left lower extremity    2. Pain and swelling of ankle         Disposition:  Discharge  3/18/2025  6:59 pm    Follow-up:  Eliu García DO  [address] [facility] [address]  [phone]                Medications Prescribed:  Discharge Medication List as of 3/18/2025  6:59 PM        START taking these medications    Details   cefadroxil 500 MG Oral Cap Take 1 capsule (500 mg total) by mouth 2 (two) times daily for 7 days., Normal, Disp-14 capsule, R-0      saccharomyces boulardii (FLORASTOR) 250 MG Oral Cap Take 1 capsule (250 mg total) by mouth 2 (two) times daily., Normal, Disp-60 capsule, R-0

## 2025-03-19 NOTE — DISCHARGE INSTRUCTIONS
Your x-ray does not show any fracture or bone injury; there is soft tissue swelling seen on x-ray.  You may have a skin infection on your ankle.    Please take the antibiotics as prescribed.    Your symptoms may also be related to gout which is an inflammatory issue and a buildup of uric acid in your joints.  Anti-inflammatories are the treatment for gout.  I will reach out to your primary care provider to see if he will do a uric acid level to have drawn and you can follow-up with him.  Your symptoms may also be related to tendinitis, which is inflammation of the tendons.  Anti-inflammatory medications or other treatment for this.    Rest, elevate and use ice as needed.  If you do not see any improvement in your symptoms within the next 3 to 4 days you should see your primary care doctor.    If you have any worsening symptoms including worsening redness or swelling, fever, red streaking, chest pain or shortness of breath or any other concerning complaints you should go to the emergency department.  You can also take Tylenol or Motrin for pain.    Please make an appointment to see your primary care doctor next week.

## 2025-03-20 ENCOUNTER — LAB ENCOUNTER (OUTPATIENT)
Dept: LAB | Facility: HOSPITAL | Age: 50
End: 2025-03-20
Payer: COMMERCIAL

## 2025-03-20 ENCOUNTER — TELEMEDICINE (OUTPATIENT)
Dept: FAMILY MEDICINE CLINIC | Facility: CLINIC | Age: 50
End: 2025-03-20

## 2025-03-20 DIAGNOSIS — Z86.718 HISTORY OF DVT OF LOWER EXTREMITY: ICD-10-CM

## 2025-03-20 DIAGNOSIS — M25.472 PAIN AND SWELLING OF ANKLE, LEFT: ICD-10-CM

## 2025-03-20 DIAGNOSIS — M25.572 PAIN AND SWELLING OF ANKLE, LEFT: Primary | ICD-10-CM

## 2025-03-20 DIAGNOSIS — M25.472 PAIN AND SWELLING OF ANKLE, LEFT: Primary | ICD-10-CM

## 2025-03-20 DIAGNOSIS — M25.572 PAIN AND SWELLING OF ANKLE, LEFT: ICD-10-CM

## 2025-03-20 LAB
D DIMER PPP FEU-MCNC: <0.27 UG/ML FEU (ref ?–0.52)
URATE SERPL-MCNC: 6 MG/DL

## 2025-03-20 PROCEDURE — 36415 COLL VENOUS BLD VENIPUNCTURE: CPT

## 2025-03-20 PROCEDURE — 85379 FIBRIN DEGRADATION QUANT: CPT

## 2025-03-20 PROCEDURE — 84550 ASSAY OF BLOOD/URIC ACID: CPT

## 2025-03-20 NOTE — PROGRESS NOTES
Virtual Visit Check-In    ROSALIE BINGHAM or legal guardian   verbally consents to a Virtual Visit Check-In service on 3/20/2025    Patient understands and accepts financial responsibility for any deductible, co-insurance and/or co-pays associated with this service.    Duration of the service: 20    Chief Complaint   Patient presents with    Injury       HPI:    Patient ID: Rosalie Bingham is a 50 year old male.    Patient is here for follow up from the urgent care for left ankle pain and was diagnosed with cellulitis of left lower extremity, pain and swelling of ankle.   Patient is being treated with cefadroxil and saccharomyces boulardii. Patient states symptoms are the same.      Patient continues to have ankle bruising and swelling and thinks that this could be gout instead of infection. Patient has a history of DVT and is worried. He continues to have swelling and pain with walking. Patient is using crutches to walk.     ROS    Review of Systems   Constitutional: Negative.  Negative for activity change and fatigue.   HENT: Negative.  Negative for congestion, ear pain, rhinorrhea and sneezing.    Eyes: Negative.  Negative for redness.   Respiratory: Negative.  Negative for cough, shortness of breath and wheezing.    Cardiovascular: Negative.  Negative for chest pain.   Gastrointestinal: Negative.  Negative for abdominal pain, constipation, diarrhea, nausea and vomiting.   Endocrine: Negative.    Genitourinary: Negative.  Negative for difficulty urinating and frequency.   Musculoskeletal:  Positive for joint swelling. Negative for back pain and myalgias.   Skin: Negative.  Negative for rash.   Allergic/Immunologic: Negative.    Neurological: Negative.  Negative for dizziness, syncope, light-headedness and headaches.   Hematological: Negative.    Psychiatric/Behavioral: Negative.               Current Outpatient Medications   Medication Sig Dispense Refill    cefadroxil 500 MG Oral Cap Take 1 capsule (500 mg  total) by mouth 2 (two) times daily for 7 days. 14 capsule 0    saccharomyces boulardii (FLORASTOR) 250 MG Oral Cap Take 1 capsule (250 mg total) by mouth 2 (two) times daily. 60 capsule 0    warfarin 5 MG Oral Tab Take as directed by INR clinic or take 1 tablet Monday Wednesday Friday, take 1/2 tablet all other days. 90 tablet 3    Omeprazole 40 MG Oral Capsule Delayed Release Take 1 capsule (40 mg total) by mouth in the morning and 1 capsule (40 mg total) before bedtime. 180 capsule 3    EMGALITY 120 MG/ML Subcutaneous Solution Prefilled Syringe Inject 120 mg into the skin every 30 (thirty) days. 1 Syringe 11    ubrogepant (UBRELVY) 50 MG Oral Tab Take 50 mg by mouth daily as needed (headache). 10 tablet 11       Allergies:Allergies[1]       Current Outpatient Medications:     cefadroxil 500 MG Oral Cap, Take 1 capsule (500 mg total) by mouth 2 (two) times daily for 7 days., Disp: 14 capsule, Rfl: 0    saccharomyces boulardii (FLORASTOR) 250 MG Oral Cap, Take 1 capsule (250 mg total) by mouth 2 (two) times daily., Disp: 60 capsule, Rfl: 0    warfarin 5 MG Oral Tab, Take as directed by INR clinic or take 1 tablet Monday Wednesday Friday, take 1/2 tablet all other days., Disp: 90 tablet, Rfl: 3    Omeprazole 40 MG Oral Capsule Delayed Release, Take 1 capsule (40 mg total) by mouth in the morning and 1 capsule (40 mg total) before bedtime., Disp: 180 capsule, Rfl: 3    EMGALITY 120 MG/ML Subcutaneous Solution Prefilled Syringe, Inject 120 mg into the skin every 30 (thirty) days., Disp: 1 Syringe, Rfl: 11    ubrogepant (UBRELVY) 50 MG Oral Tab, Take 50 mg by mouth daily as needed (headache)., Disp: 10 tablet, Rfl: 11    Past Medical History:    DVT (deep venous thrombosis) (HCC)    per ng coumadin    Esophageal reflux    Protein C deficiency (HCC)    per ng & lupus antocoag. def    Pulmonary embolism (HCC)    per ng coumadin    Syncope         PHYSICAL EXAM:     Patient was speaking in complete sentences, no increased  work of breathing and very coherent and alert on the phone.  Alert and oriented x 3  Patient was responding to questions appropriately.  Patient did not appear short of breath.    ASSESSMENT/PLAN:     Encounter Diagnosis   Name Primary?    Pain and swelling of ankle, left Yes       1. Pain and swelling of ankle, left  -Advised patient to use medications as directed the ER  -Labs ordered to check for gout  -Patient can take OTC tylenol/ibuprofen for breakthrough pain  -Stretches/moist warm compresses/massages for 10-15 minutes 3-4 times a day  - Uric Acid    Patient verbalized understanding of plan and all questions answered to the best of my ability.    Patient to call back if any change/ worsening of symptoms.    Orders Placed This Encounter   Procedures    Uric Acid       Meds This Visit:  Requested Prescriptions      No prescriptions requested or ordered in this encounter       Imaging & Referrals:  None               NIKUNJ Alexandra  3/20/2025  10:22 AM      Please note that the following visit was completed using two-way, real-time interactive audio and/or video communication.  This has been done in good vivek to provide continuity of care in the best interest of the provider-patient relationship, due to the ongoing public health crisis/national emergency and because of restrictions of visitation.  There are limitations of this visit as no physical exam could be performed.  Every conscious effort was taken to allow for sufficient and adequate time.  This billing was spent on reviewing labs, medications, radiology tests and decision making.  Appropriate medical decision-making and tests are ordered as detailed in the plan of care above  ID#[id number]       [1]   Allergies  Allergen Reactions    Shrimp SWELLING

## 2025-03-20 NOTE — TELEPHONE ENCOUNTER
Chana ===see below and advise regarding his request Thank you .        Per patient, he had a video visit earlier with Chana López.  He showed to her about his left ankle bruise and swelling.   He is taking antibiotic for possibel cellulitis or gout .  He is also taking warfarin 5 mg for his DVT .     Left ankle swelling since  Tuesday afternoon.   He is concerned about possible DVT. He is requesting if Chana can order the blood test so that he can do it at the same time as his uric acid lab.

## 2025-03-20 NOTE — TELEPHONE ENCOUNTER
Advised patient of Chana López's note. Patient verbalized understanding and will get the blood work done today.

## 2025-03-24 ENCOUNTER — TELEPHONE (OUTPATIENT)
Dept: FAMILY MEDICINE CLINIC | Facility: CLINIC | Age: 50
End: 2025-03-24

## 2025-03-24 NOTE — TELEPHONE ENCOUNTER
The patient called regarding his results - he states he saw his uric acid level was 6 and is asking if there is anything he is going to be put on for that. Informed him that his levels were normal so he would not be placed on anything for that.     He states his ankle is swollen again today and the pain Is back but now it is in the back of his ankle, where it was on the side before. He states his symptoms had all resolved but returned yesterday. He has not been taking any tylenol or icing or elevating the last several days.     Chana López please advise next steps for the patient.

## 2025-03-24 NOTE — TELEPHONE ENCOUNTER
Patient was called and inform of NIKUNJ Zayas message below and patient verbalized understanding.He will call us back to set up a appointment if not better.  Patient also stated that he will see a podiatrist tomorrow to take a look at it.

## 2025-03-24 NOTE — TELEPHONE ENCOUNTER
Patient's lab work was normal. Advised to continue his antibiotics and schedule in person apt if symptoms are not better. He should be putting Ice and elevate the ankle as much as possible.

## 2025-03-31 RX ORDER — OMEPRAZOLE 40 MG/1
40 CAPSULE, DELAYED RELEASE ORAL 2 TIMES DAILY
Qty: 180 CAPSULE | Refills: 3 | Status: SHIPPED | OUTPATIENT
Start: 2025-03-31

## 2025-04-09 ENCOUNTER — ANTI-COAG VISIT (OUTPATIENT)
Dept: ANTICOAGULATION | Facility: CLINIC | Age: 50
End: 2025-04-09

## 2025-04-09 DIAGNOSIS — Z79.01 MONITORING FOR LONG-TERM ANTICOAGULANT USE: ICD-10-CM

## 2025-04-09 DIAGNOSIS — Z51.81 MONITORING FOR LONG-TERM ANTICOAGULANT USE: ICD-10-CM

## 2025-04-09 DIAGNOSIS — D68.59 PROTEIN C DEFICIENCY (HCC): Primary | ICD-10-CM

## 2025-04-09 LAB
INR: 2.7 (ref 2–3)
TEST STRIP EXPIRATION DATE: ABNORMAL DATE

## 2025-04-09 PROCEDURE — 85610 PROTHROMBIN TIME: CPT | Performed by: FAMILY MEDICINE

## 2025-04-09 PROCEDURE — 93793 ANTICOAG MGMT PT WARFARIN: CPT | Performed by: FAMILY MEDICINE

## 2025-04-09 NOTE — PROGRESS NOTES
Face-to-Face  / INR 2.7,  therapeutic. (Goal 2.5 ) TTR 73.7 %     Etiology: INR is very stable. This RN educated about calling for medication or health changes.  More frequent testing if INR is sub therapeutic.    PLAN: continue the current dose.    Recheck INR 8 weeks.    Pt reports:    No sign of unusual bruising or bleeding.  Any missed doses: No   Medications changes: No  Diet changes:  No    Contacted  Krishan verbalized understanding and agreement.    WARFARIN Plan per protocol: 5 mg every Mon, Wed, Fri; 2.5 mg all other days

## 2025-06-04 ENCOUNTER — ANTI-COAG VISIT (OUTPATIENT)
Dept: ANTICOAGULATION | Facility: CLINIC | Age: 50
End: 2025-06-04

## 2025-06-04 DIAGNOSIS — D68.59 PROTEIN C DEFICIENCY (HCC): Primary | ICD-10-CM

## 2025-06-04 DIAGNOSIS — Z79.01 MONITORING FOR LONG-TERM ANTICOAGULANT USE: ICD-10-CM

## 2025-06-04 DIAGNOSIS — Z51.81 MONITORING FOR LONG-TERM ANTICOAGULANT USE: ICD-10-CM

## 2025-06-04 LAB
INR: 2.9 (ref 2–3)
TEST STRIP EXPIRATION DATE: ABNORMAL DATE

## 2025-06-04 PROCEDURE — 93793 ANTICOAG MGMT PT WARFARIN: CPT | Performed by: FAMILY MEDICINE

## 2025-06-04 PROCEDURE — 85610 PROTHROMBIN TIME: CPT | Performed by: FAMILY MEDICINE

## 2025-06-04 NOTE — PROGRESS NOTES
Face-to-Face  / INR 2.9,  therapeutic. (Goal 2.5 ) TTR 74.8 %     Etiology: No  changes. Reminded that his INR is dependant on Omeprazole and if he stops that his Inr will drop. He verbalized understanding.    PLAN: continue the current dose.    Recheck INR 8 weeks.    Pt reports:    No sign of unusual bruising or bleeding.  Any missed doses: No   Medications changes: No  Diet changes:  No    Contacted  Krishan verbalized understanding and agreement.    WARFARIN Plan per protocol: 5 mg every Mon, Wed, Fri; 2.5 mg all other days

## 2025-07-30 ENCOUNTER — ANTI-COAG VISIT (OUTPATIENT)
Dept: ANTICOAGULATION | Facility: CLINIC | Age: 50
End: 2025-07-30

## 2025-07-30 DIAGNOSIS — D68.59 PROTEIN C DEFICIENCY (HCC): Primary | ICD-10-CM

## 2025-07-30 DIAGNOSIS — Z51.81 MONITORING FOR LONG-TERM ANTICOAGULANT USE: ICD-10-CM

## 2025-07-30 DIAGNOSIS — Z79.01 MONITORING FOR LONG-TERM ANTICOAGULANT USE: ICD-10-CM

## 2025-07-30 LAB
INR: 3 (ref 2–3)
TEST STRIP EXPIRATION DATE: ABNORMAL DATE

## 2025-07-30 PROCEDURE — 93793 ANTICOAG MGMT PT WARFARIN: CPT | Performed by: FAMILY MEDICINE

## 2025-07-30 PROCEDURE — 85610 PROTHROMBIN TIME: CPT | Performed by: FAMILY MEDICINE

## 2025-08-26 RX ORDER — WARFARIN SODIUM 5 MG/1
TABLET ORAL
Qty: 90 TABLET | Refills: 0 | Status: SHIPPED | OUTPATIENT
Start: 2025-08-26

## (undated) DIAGNOSIS — D68.9 COAGULATION DEFECT (HCC): ICD-10-CM

## (undated) DIAGNOSIS — D68.59 PROTEIN C DEFICIENCY (HCC): Primary | ICD-10-CM

## (undated) NOTE — ED AVS SNAPSHOT
Stacy Sam   MRN: [id number]    Department:  [facility] Emergency Department   Date of Visit:  11/27/2018           Disclosure     Insurance plans vary and the physician(s) referred by the ER may not be covered by your plan.  Please contac CARE PHYSICIAN AT ONCE OR RETURN IMMEDIATELY TO THE EMERGENCY DEPARTMENT. If you have been prescribed any medication(s), please fill your prescription right away and begin taking the medication(s) as directed.   If you believe that any of the medications

## (undated) NOTE — LETTER
19        To Whom It May Concern: Power Gary  :  2/3/1975    []  Patient has been cleared to hold Warfarin for 3 to 5 days prior to Bilateral S1 Transforaminal Epidural Steroid Injection.  Holding the medication(s) may put the patient a

## (undated) NOTE — ED AVS SNAPSHOT
[facility] Emergency Department    [place] [address]     [address] [place] [address]    Phone:  [phone]    Fax:  [phone]           Randye Gosselin   MRN: [id number]    Department:  [facility] Emergency Department   Date of Visit:  6/1 and Class Registration line at [phone] or find a doctor online by visiting [email or web address].    IF THERE IS ANY CHANGE OR WORSENING OF YOUR CONDITION, CALL YOUR PRIMARY CARE PHYSICIAN AT ONCE OR RETURN IMMEDIATELY TO [address] [place] [address].     If

## (undated) NOTE — ED AVS SNAPSHOT
[facility] Emergency Department    [place]     [address] [place] [address]    Phone:  [phone]    Fax:  [phone]           Stacy Sam   MRN: [id number]    Department:  [facility] Emergency Department   Date of Visit:  6/1 If you have difficulty scheduling your follow-up appointment as directed, please call our  at [phone]. Si tiene problemas para programar ryan vasu de seguimiento según lo indicado, llame al encargado de chintan al [phone].     It i continue to take your medications as instructed by your Primary Care doctor until you can check with your doctor. Please bring the medication list to your next doctor's appointment.     Any imaging studies and labs completed today can be reviewed in your [place] plans. To get signed up and covered, please call [phone] and ask to get set up for an insurance coverage that is in-network with [facility].         [employer]     Call the [employer] for assistance with your inactive [employer] account

## (undated) NOTE — MR AVS SNAPSHOT
[facility] SPECIALTY [facility] [place] [address] [address] [address]  [phone]               Thank you for choosing us for your health care visit with Albertina Garvin DO.   We are glad to serve you and happy to provide you with this summary of Instructions and Information about Your Health     None      Allergies as of May 01, 2017     Shrimp Swelling                Today's Vital Signs     BP Pulse Temp Height Weight BMI    120/85 mmHg 86 98.1 °F (36.7 °C) (Oral) 5' 9.5\" (1.765 m) 152 lb (68.94

## (undated) NOTE — LETTER
19        To Whom It May Concern: Nathalie Felt  :  2/3/1975    []  Patient has been cleared to hold Warfarin for 3-5 days prior to transforaminal epidural steroid injections.   Holding the medication(s) may put the patient at an increased